# Patient Record
Sex: FEMALE | Race: WHITE | NOT HISPANIC OR LATINO | Employment: UNEMPLOYED | URBAN - METROPOLITAN AREA
[De-identification: names, ages, dates, MRNs, and addresses within clinical notes are randomized per-mention and may not be internally consistent; named-entity substitution may affect disease eponyms.]

---

## 2019-05-29 ENCOUNTER — TRANSFERRED RECORDS (OUTPATIENT)
Dept: HEALTH INFORMATION MANAGEMENT | Facility: CLINIC | Age: 39
End: 2019-05-29

## 2021-02-15 ENCOUNTER — TRANSFERRED RECORDS (OUTPATIENT)
Dept: HEALTH INFORMATION MANAGEMENT | Facility: CLINIC | Age: 41
End: 2021-02-15

## 2021-04-05 ENCOUNTER — TRANSFERRED RECORDS (OUTPATIENT)
Dept: HEALTH INFORMATION MANAGEMENT | Facility: CLINIC | Age: 41
End: 2021-04-05

## 2021-04-05 LAB
ALT SERPL-CCNC: 12 U/L (ref 0–55)
AST SERPL-CCNC: 12 U/L (ref 10–40)
CHOLESTEROL (EXTERNAL): 147 MG/DL (ref 0–199)
CREATININE (EXTERNAL): 0.73 MG/DL (ref 0.55–1.02)
GFR ESTIMATED (EXTERNAL): >60 ML/MIN/1.73M2
GLUCOSE (EXTERNAL): 74 MG/DL (ref 70–100)
HDLC SERPL-MCNC: 48 MG/DL
HPV ABSTRACT: ABNORMAL
LDL CHOLESTEROL CALCULATED (EXTERNAL): 76 MG/DL
NON HDL CHOLESTEROL (EXTERNAL): 99 MG/DL
PAP-ABSTRACT: NORMAL
POTASSIUM (EXTERNAL): 4.1 MMOL/L (ref 3.5–5.1)
TRIGLYCERIDES (EXTERNAL): 114 MG/DL
TSH SERPL-ACNC: 0.56 UIU/ML (ref 0.3–4.5)

## 2021-05-18 ENCOUNTER — TRANSFERRED RECORDS (OUTPATIENT)
Dept: HEALTH INFORMATION MANAGEMENT | Facility: CLINIC | Age: 41
End: 2021-05-18

## 2021-06-10 ENCOUNTER — TRANSFERRED RECORDS (OUTPATIENT)
Dept: HEALTH INFORMATION MANAGEMENT | Facility: CLINIC | Age: 41
End: 2021-06-10

## 2021-09-01 ENCOUNTER — TRANSFERRED RECORDS (OUTPATIENT)
Dept: HEALTH INFORMATION MANAGEMENT | Facility: CLINIC | Age: 41
End: 2021-09-01

## 2021-09-01 LAB
CREATININE (EXTERNAL): 0.79 MG/DL (ref 0.55–1.02)
GFR ESTIMATED (EXTERNAL): >60 ML/MIN/1.73M2
GLUCOSE (EXTERNAL): 97 MG/DL (ref 70–100)
POTASSIUM (EXTERNAL): 4.2 MMOL/L (ref 3.5–5.1)

## 2021-09-13 ENCOUNTER — TRANSFERRED RECORDS (OUTPATIENT)
Dept: HEALTH INFORMATION MANAGEMENT | Facility: CLINIC | Age: 41
End: 2021-09-13

## 2021-09-16 ENCOUNTER — TRANSFERRED RECORDS (OUTPATIENT)
Dept: HEALTH INFORMATION MANAGEMENT | Facility: CLINIC | Age: 41
End: 2021-09-16

## 2021-09-24 ENCOUNTER — TELEPHONE (OUTPATIENT)
Dept: FAMILY MEDICINE | Facility: CLINIC | Age: 41
End: 2021-09-24

## 2021-09-27 NOTE — TELEPHONE ENCOUNTER
Attempted to call patient, no voicemail set up unable to leave message. Pt has a weight loss intake appointment on 09/29/2021, calling to confirm she is coming to her appointment 30 minutes early to complete forms as she does not have my chart and he forms are not complete.    Scarlett Clement LPN  9/27/2021  12:50 PM

## 2021-09-28 NOTE — TELEPHONE ENCOUNTER
Called pt-she will come at 10:30am tomorrow to fill out her forms for her appointment.    Scarlett Clement LPN  9/28/2021  10:48 AM

## 2021-09-29 ENCOUNTER — OFFICE VISIT (OUTPATIENT)
Dept: FAMILY MEDICINE | Facility: CLINIC | Age: 41
End: 2021-09-29
Payer: MEDICAID

## 2021-09-29 VITALS
RESPIRATION RATE: 18 BRPM | DIASTOLIC BLOOD PRESSURE: 68 MMHG | WEIGHT: 195 LBS | OXYGEN SATURATION: 98 % | SYSTOLIC BLOOD PRESSURE: 134 MMHG | HEIGHT: 61 IN | HEART RATE: 68 BPM | BODY MASS INDEX: 36.82 KG/M2

## 2021-09-29 DIAGNOSIS — M54.50 CHRONIC LOW BACK PAIN, UNSPECIFIED BACK PAIN LATERALITY, UNSPECIFIED WHETHER SCIATICA PRESENT: ICD-10-CM

## 2021-09-29 DIAGNOSIS — F32.A DEPRESSION, UNSPECIFIED DEPRESSION TYPE: ICD-10-CM

## 2021-09-29 DIAGNOSIS — G47.00 INSOMNIA, UNSPECIFIED TYPE: ICD-10-CM

## 2021-09-29 DIAGNOSIS — Z86.718 PERSONAL HISTORY OF DVT (DEEP VEIN THROMBOSIS): ICD-10-CM

## 2021-09-29 DIAGNOSIS — R53.81 PHYSICAL DECONDITIONING: ICD-10-CM

## 2021-09-29 DIAGNOSIS — E66.812 CLASS 2 OBESITY WITH BODY MASS INDEX (BMI) OF 36.0 TO 36.9 IN ADULT, UNSPECIFIED OBESITY TYPE, UNSPECIFIED WHETHER SERIOUS COMORBIDITY PRESENT: Primary | ICD-10-CM

## 2021-09-29 DIAGNOSIS — G89.29 CHRONIC LOW BACK PAIN, UNSPECIFIED BACK PAIN LATERALITY, UNSPECIFIED WHETHER SCIATICA PRESENT: ICD-10-CM

## 2021-09-29 PROCEDURE — 99205 OFFICE O/P NEW HI 60 MIN: CPT | Performed by: FAMILY MEDICINE

## 2021-09-29 RX ORDER — DULOXETIN HYDROCHLORIDE 60 MG/1
60 CAPSULE, DELAYED RELEASE ORAL 2 TIMES DAILY
COMMUNITY
End: 2022-12-23 | Stop reason: DRUGHIGH

## 2021-09-29 RX ORDER — HYDROXYZINE HYDROCHLORIDE 25 MG/1
25 TABLET, FILM COATED ORAL 3 TIMES DAILY PRN
COMMUNITY
End: 2024-02-20

## 2021-09-29 RX ORDER — ASPIRIN 81 MG/1
81 TABLET ORAL DAILY
COMMUNITY

## 2021-09-29 RX ORDER — TRAZODONE HYDROCHLORIDE 150 MG/1
25 TABLET ORAL AT BEDTIME
COMMUNITY
End: 2024-07-09

## 2021-09-29 RX ORDER — CYCLOBENZAPRINE HCL 10 MG
10 TABLET ORAL 3 TIMES DAILY PRN
COMMUNITY
End: 2022-02-25

## 2021-09-29 RX ORDER — CETIRIZINE HYDROCHLORIDE 10 MG/1
10 TABLET ORAL 2 TIMES DAILY
COMMUNITY

## 2021-09-29 RX ORDER — BIOTIN 1 MG
1000 TABLET ORAL DAILY
COMMUNITY
End: 2021-10-27

## 2021-09-29 RX ORDER — FAMOTIDINE 20 MG
TABLET ORAL
COMMUNITY
End: 2022-12-23 | Stop reason: ALTCHOICE

## 2021-09-29 RX ORDER — MULTIVITAMIN,THERAPEUTIC
1 TABLET ORAL DAILY
COMMUNITY
End: 2021-10-27

## 2021-09-29 RX ORDER — GABAPENTIN 600 MG/1
600 TABLET ORAL 3 TIMES DAILY
COMMUNITY

## 2021-09-29 ASSESSMENT — MIFFLIN-ST. JEOR: SCORE: 1496.89
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
## 2021-09-29 NOTE — PATIENT INSTRUCTIONS
Macronutrient Goals    Minimum 3? meals per day, control daily calories   - 1500? female   - 1600 male  Minimum of 4 palm servings of protein daily.  Begin everyday with protein    - ~80 gm for female   - 120 gm for male  Be mindful of net carbs 50-75 gm/day  - (Total carbs - fiber)   - Less than 5 gm of carbs with breakfast    Supplementation   - 1 multivitamin   - clear and copious urination (adequate water consumption)   - 2000 mg fish oil capsules    - 2000 IU Vitamin D (Unless checked during clinic visit I will be checking your level today and may send a prescriptionto your pharmacy if necessary)    Ketogenic diet?    Coffee: Try heavy lifting cream with your coffee.  You could also try bulletproof coffee (butter with coffee plus/minus MCT Oil).  Lastly you could also swing it with simply ALLUOSE (artificial sweetener sugar substitute)    Dr. Dave Campos MD   - The Obesity Code   - YouTube    Dr. Sean Stratton MD   - Always Hungry    Dr. Farzana Flanagan, DO.   Search for her name in Youtube with added criteria: Desmond Garcia, PhD   - Why We Sleep   - search for author name and podcast for a number of interviews  _______________________________    Interested articles on nutrition:    Saturated fat: Saturated Fats and Health: A Reassessment and Proposal for Food-Based Recommendations: JACC State-of-the-Art Review June (https://www.jacc.org/doi/full/10.1016/j.jacc.2020.05.077)    A Pesco-Mediterranean Diet With Intermittent Fasting: JACC Review Topic of the Week (https://www.jacc.org/doi/full/10.1016/j.jacc.2020.07.049)

## 2021-09-29 NOTE — ASSESSMENT & PLAN NOTE
"40 year old year old female in clinic today to discuss treatment of the following conditions through diet and lifestyle modification and weight loss:  1. Class 2 obesity with body mass index (BMI) of 36.0 to 36.9 in adult, unspecified obesity type, unspecified whether serious comorbidity present    2. Depression, unspecified depression type    3. Chronic low back pain, unspecified back pain laterality, unspecified whether sciatica present    4. Insomnia, unspecified type    5. Physical deconditioning    6. Personal history of DVT (deep vein thrombosis)      Intake: This individual has not struggled with weight her entire life.  This is a phenomenon over the past several years.  I suspect that chronic pain and depression are contributory.  Unclear if she is gained weight as result of her medications although she is on a number of medications of potentially have weight gain as a side effect.  Energy is poor.  She describes symptoms of hypoglycemia which suggests some degree of metabolic syndrome.  She has central adiposity.  Her laboratory testing was not available for review although she tells me that she recently had testing which was \"good.\"  We do lengthy conversation regarding nutrition.  I suggested nutritional restriction of carbohydrates with a focus on protein.  Currently, she is eating 1 meal per day.  Recommended that she focus on eating both lunch and dinner in an effort to improve her nutrition and her energy.  She will continue to try to be physically active.  She is consuming artificial sweeteners throughout the day as she flavors her water.  We discussed that this might be a barrier to losing weight and she should consider decreasing or eliminating the supplement.  First significant other is with her who does much of the cooking.  It sounds like the 2 of them are interested in nutritional change.  We reviewed medications.  She tells me that she is been on phentermine in the past without much efficacy.  " This fits with her lack of eating throughout the day and then eating only in the evening.  I do not believe she is overeating.  Phentermine probably would not be helpful at this point.  Given that she is already on mental health medicines, I would avoid Contrave at least initially.  She would do quite well on semaglutide although I doubt she get coverage with her insurance plan.  If she does have laboratory evidence of metabolic syndrome or prediabetes I had consider starting Metformin as a complement to a low carbohydrate nutritional plan.  We will follow up with a video visit in 6 weeks or so.  Consider laboratory testing (Alisia-IR or GTT?).

## 2021-10-06 ENCOUNTER — VIRTUAL VISIT (OUTPATIENT)
Dept: SURGERY | Facility: CLINIC | Age: 41
End: 2021-10-06
Payer: COMMERCIAL

## 2021-10-06 DIAGNOSIS — E66.812 CLASS 2 OBESITY WITH BODY MASS INDEX (BMI) OF 36.0 TO 36.9 IN ADULT, UNSPECIFIED OBESITY TYPE, UNSPECIFIED WHETHER SERIOUS COMORBIDITY PRESENT: Primary | ICD-10-CM

## 2021-10-06 DIAGNOSIS — Z71.3 NUTRITIONAL COUNSELING: ICD-10-CM

## 2021-10-06 PROCEDURE — 97802 MEDICAL NUTRITION INDIV IN: CPT | Mod: 95 | Performed by: DIETITIAN, REGISTERED

## 2021-10-06 NOTE — PROGRESS NOTES
Bridgett Oliveira is a 40 year old who is being evaluated via a billable video visit.      How would you like to obtain your AVS? MyChart  If the video visit is dropped, the invitation should be resent by: Send to e-mail at: [email or web address]  Will anyone else be joining your video visit? No      Video Start Time: 2:04 PM      Medical Weight Loss Initial Diet Evaluation  Assessment:  Bridgett is presenting today for a new weight management nutrition consultation. Pt has had an initial appointment with Dr. Beach.  Is actually considering Bariatric Surgery (LSG) at this time.    Weight loss medication: None.       Anthropometrics:    Current Weight: 195 lbs   BMI: There is no height or weight on file to calculate BMI.   Ideal body weight: 47.8 kg (105 lb 6.1 oz)  Adjusted ideal body weight: 64.1 kg (141 lb 3.6 oz)    Estimated RMR ([place] equation):  1500 kcals x 1.3 (light active) = 1950 kcals (for weight maintenance)    Recommended Protein Intake: 60-80 grams of protein/day    Medical History:  Patient Active Problem List   Diagnosis     Class 2 obesity with body mass index (BMI) of 36.0 to 36.9 in adult, unspecified obesity type, unspecified whether serious comorbidity present     Depression, unspecified depression type     Chronic low back pain, unspecified back pain laterality, unspecified whether sciatica present     Insomnia, unspecified type     Personal history of DVT (deep vein thrombosis)      Diabetes: No   HbA1c:  No results found for: HGBA1C    Nutrition History:   Food allergies/intolerances/cultural or religous food customs: No     Vitamins/Mineral Supplementation: Vitamin D, Calcium, Biotin, Prenatal (needs to )     Dietary Recall:  Breakfast: 4 cups of coffee with creamer   Lunch: granola bars or fruit (typically something to snack on vs a meal)   Dinner: meat and veggies or sushi   Typical Snacks: frozen fudge bars (2)   Overnight eating: No     Eating out: 1 time/month  (occasional pizza)     Beverages: Coffee, Flavored Water (64-96 oz/day), Beer 1-2 cans/day     Exercise: no set regimen-does have a treadmill   Walking/Hunting 1 mile/day (almost daily)     Nutrition Diagnosis (PES statement):   Overweight/Obesity (NC 3.3) related to overeating and poor lifestyle habits as evidenced by patient report of excessive energy intake, lack of exercise, and BMI of 36.84 kg/m2.     Nutrition Intervention  1. Food and/or Nutrient Delivery   a. Placed emphasis on importance of developing a healthy meal routine, aiming for 3 meals a day and no snacks.  b. Discussed using a protein supplement as a meal replacement.  2. Nutrition Education   a. Discussed with patient how to build a meal: the importance of including a lean/low fat protein at each meal, include a source of vegetables at a minimum of lunch and dinner and limiting carbohydrate intake to <25% per meal.  b. Educated on sources of lean protein, portion sizes, the amount of grams found in each source. Recommend patient to aim for 20-30g protein at each meal.  c. Educated on how to read a food label: keeping total fat <10g and sugar <10g per serving.  d. Discussed the importance of adequate hydration, with emphasis on drinking 64oz of water or zero calorie beverages per day.    3. Nutrition Counseling   a. Encouraged importance of developing routine exercise for health benefits and weight loss.    Goals established by patient:   1. Focus on protein first, aiming for 60-80 grams of protein/day.    2. Trial of Protein Shake as an option for consistency of breakfast.   3. Track intake via food journal, aiming for 7980-8136 calories/day.   Handouts provided:  None     Assessment/Plan:    Pt will follow up in 1 month(s) with bariatrician and 1 month(s) with dietitian.     Video-Visit Details    Type of service:  Video Visit    Video End Time:2:47 PM    Originating Location (pt. Location): Home    Distant Location (provider location):  [facility]  [facility] [place]     Platform used for Video Visit: Allison Roberts, RD

## 2021-10-06 NOTE — LETTER
10/6/2021         RE: Bridgett Oliveira  [address] [facility] [address]        Dear Colleague,    Thank you for referring your patient, Bridgett Oliveira, to the [facility] AND BARIATRICS CARE [place]. Please see a copy of my visit note below.    Bridgett Oliveira is a 40 year old who is being evaluated via a billable video visit.      How would you like to obtain your AVS? MyChart  If the video visit is dropped, the invitation should be resent by: Send to e-mail at: [email or web address]  Will anyone else be joining your video visit? No      Video Start Time: 2:04 PM      Medical Weight Loss Initial Diet Evaluation  Assessment:  Bridgett is presenting today for a new weight management nutrition consultation. Pt has had an initial appointment with Dr. Beach.  Is actually considering Bariatric Surgery (LSG) at this time.    Weight loss medication: None.       Anthropometrics:    Current Weight: 195 lbs   BMI: There is no height or weight on file to calculate BMI.   Ideal body weight: 47.8 kg (105 lb 6.1 oz)  Adjusted ideal body weight: 64.1 kg (141 lb 3.6 oz)    Estimated RMR (Rabun-St Jeor equation):  1500 kcals x 1.3 (light active) = 1950 kcals (for weight maintenance)    Recommended Protein Intake: 60-80 grams of protein/day    Medical History:  Patient Active Problem List   Diagnosis     Class 2 obesity with body mass index (BMI) of 36.0 to 36.9 in adult, unspecified obesity type, unspecified whether serious comorbidity present     Depression, unspecified depression type     Chronic low back pain, unspecified back pain laterality, unspecified whether sciatica present     Insomnia, unspecified type     Personal history of DVT (deep vein thrombosis)      Diabetes: No   HbA1c:  No results found for: HGBA1C    Nutrition History:   Food allergies/intolerances/cultural or religous food customs: No     Vitamins/Mineral Supplementation: Vitamin D, Calcium, Biotin, Prenatal (needs to pick  up)     Dietary Recall:  Breakfast: 4 cups of coffee with creamer   Lunch: granola bars or fruit (typically something to snack on vs a meal)   Dinner: meat and veggies or sushi   Typical Snacks: frozen fudge bars (2)   Overnight eating: No     Eating out: 1 time/month (occasional pizza)     Beverages: Coffee, Flavored Water (64-96 oz/day), Beer 1-2 cans/day     Exercise: no set regimen-does have a treadmill   Walking/Hunting 1 mile/day (almost daily)     Nutrition Diagnosis (PES statement):   Overweight/Obesity (NC 3.3) related to overeating and poor lifestyle habits as evidenced by patient report of excessive energy intake, lack of exercise, and BMI of 36.84 kg/m2.     Nutrition Intervention  1. Food and/or Nutrient Delivery   a. Placed emphasis on importance of developing a healthy meal routine, aiming for 3 meals a day and no snacks.  b. Discussed using a protein supplement as a meal replacement.  2. Nutrition Education   a. Discussed with patient how to build a meal: the importance of including a lean/low fat protein at each meal, include a source of vegetables at a minimum of lunch and dinner and limiting carbohydrate intake to <25% per meal.  b. Educated on sources of lean protein, portion sizes, the amount of grams found in each source. Recommend patient to aim for 20-30g protein at each meal.  c. Educated on how to read a food label: keeping total fat <10g and sugar <10g per serving.  d. Discussed the importance of adequate hydration, with emphasis on drinking 64oz of water or zero calorie beverages per day.    3. Nutrition Counseling   a. Encouraged importance of developing routine exercise for health benefits and weight loss.    Goals established by patient:   1. Focus on protein first, aiming for 60-80 grams of protein/day.    2. Trial of Protein Shake as an option for consistency of breakfast.   3. Track intake via food journal, aiming for 8255-6116 calories/day.   Handouts provided:  None      Assessment/Plan:    Pt will follow up in 1 month(s) with bariatrician and 1 month(s) with dietitian.     Video-Visit Details    Type of service:  Video Visit    Video End Time:2:47 PM    Originating Location (pt. Location): Home    Distant Location (provider location):  [facility] [place]     Platform used for Video Visit: Allison Roberts RD        Again, thank you for allowing me to participate in the care of your patient.        Sincerely,        Nubia Roberts RD

## 2021-10-17 ENCOUNTER — HEALTH MAINTENANCE LETTER (OUTPATIENT)
Age: 41
End: 2021-10-17

## 2021-10-27 ENCOUNTER — OFFICE VISIT (OUTPATIENT)
Dept: FAMILY MEDICINE | Facility: CLINIC | Age: 41
End: 2021-10-27
Payer: COMMERCIAL

## 2021-10-27 VITALS
OXYGEN SATURATION: 96 % | DIASTOLIC BLOOD PRESSURE: 62 MMHG | WEIGHT: 195.2 LBS | HEIGHT: 61 IN | HEART RATE: 83 BPM | BODY MASS INDEX: 36.85 KG/M2 | SYSTOLIC BLOOD PRESSURE: 110 MMHG

## 2021-10-27 DIAGNOSIS — E66.812 CLASS 2 OBESITY WITH BODY MASS INDEX (BMI) OF 36.0 TO 36.9 IN ADULT, UNSPECIFIED OBESITY TYPE, UNSPECIFIED WHETHER SERIOUS COMORBIDITY PRESENT: Primary | ICD-10-CM

## 2021-10-27 PROCEDURE — 99215 OFFICE O/P EST HI 40 MIN: CPT | Performed by: FAMILY MEDICINE

## 2021-10-27 RX ORDER — SEMAGLUTIDE 0.25 MG/.5ML
0.25 INJECTION, SOLUTION SUBCUTANEOUS WEEKLY
Qty: 2 ML | Refills: 0 | Status: SHIPPED | OUTPATIENT
Start: 2021-10-27 | End: 2022-01-17

## 2021-10-27 RX ORDER — METFORMIN HCL 500 MG
500 TABLET, EXTENDED RELEASE 24 HR ORAL
Qty: 30 TABLET | Refills: 1 | Status: SHIPPED | OUTPATIENT
Start: 2021-10-27 | End: 2022-05-24

## 2021-10-27 ASSESSMENT — PATIENT HEALTH QUESTIONNAIRE - PHQ9
SUM OF ALL RESPONSES TO PHQ QUESTIONS 1-9: 24
5. POOR APPETITE OR OVEREATING: NEARLY EVERY DAY

## 2021-10-27 ASSESSMENT — ANXIETY QUESTIONNAIRES
5. BEING SO RESTLESS THAT IT IS HARD TO SIT STILL: NEARLY EVERY DAY
2. NOT BEING ABLE TO STOP OR CONTROL WORRYING: NEARLY EVERY DAY
IF YOU CHECKED OFF ANY PROBLEMS ON THIS QUESTIONNAIRE, HOW DIFFICULT HAVE THESE PROBLEMS MADE IT FOR YOU TO DO YOUR WORK, TAKE CARE OF THINGS AT HOME, OR GET ALONG WITH OTHER PEOPLE: EXTREMELY DIFFICULT
1. FEELING NERVOUS, ANXIOUS, OR ON EDGE: NEARLY EVERY DAY
7. FEELING AFRAID AS IF SOMETHING AWFUL MIGHT HAPPEN: SEVERAL DAYS
6. BECOMING EASILY ANNOYED OR IRRITABLE: NEARLY EVERY DAY
GAD7 TOTAL SCORE: 18
3. WORRYING TOO MUCH ABOUT DIFFERENT THINGS: MORE THAN HALF THE DAYS

## 2021-10-27 ASSESSMENT — MIFFLIN-ST. JEOR: SCORE: 1497.8

## 2021-10-27 NOTE — ASSESSMENT & PLAN NOTE
40 year old year old female in clinic today to discuss treatment of the following conditions through diet and lifestyle modification and weight loss:  1. Class 2 obesity with body mass index (BMI) of 36.0 to 36.9 in adult, unspecified obesity type, unspecified whether serious comorbidity present      The patient's weight loss result since the last visit was mixed based on weight stability.  She expresses frustration at lack of weight loss.  She has made a number of changes.  She is restricted her carbohydrates quite a bit.  At the same time, she does have some symptoms of low blood sugar that requires her to take sugar tabs (bottle caps).  Today, she expresses an interest in surgical weight loss.  I am concerned that she will not meet criteria given that her BMI is 37 without medical comorbidities.  She may have obstructive sleep apnea which is not been evaluated.  I reviewed her labs via her other health system today and she has had a normal cholesterol profile as well as a normal fasting glucose level.  Thyroid was also measured within the past 6 months without abnormalities.  Last time, we did not consider pharmaceuticals.  I believe she would do well on a GLP-1 receptor agonist primarily for the insulin resistance effects.  However, I am not confident it will be covered by her insurance.  We also talked through whether or not she might benefit from Metformin.  Both medications were prescribed and after we get word that she will not get coverage for Wegovy, we will proceed with Metformin.  Follow-up in 1 month.  A note was sent to one of our surgical tract colleagues to see if it is true that she would not meet her criteria.

## 2021-10-27 NOTE — PROGRESS NOTES
Assessment and Plan:     Class 2 obesity with body mass index (BMI) of 36.0 to 36.9 in adult, unspecified obesity type, unspecified whether serious comorbidity present  40 year old year old female in clinic today to discuss treatment of the following conditions through diet and lifestyle modification and weight loss:  1. Class 2 obesity with body mass index (BMI) of 36.0 to 36.9 in adult, unspecified obesity type, unspecified whether serious comorbidity present      The patient's weight loss result since the last visit was mixed based on weight stability.  She expresses frustration at lack of weight loss.  She has made a number of changes.  She is restricted her carbohydrates quite a bit.  At the same time, she does have some symptoms of low blood sugar that requires her to take sugar tabs (bottle caps).  Today, she expresses an interest in surgical weight loss.  I am concerned that she will not meet criteria given that her BMI is 37 without medical comorbidities.  She may have obstructive sleep apnea which is not been evaluated.  I reviewed her labs via her other health system today and she has had a normal cholesterol profile as well as a normal fasting glucose level.  Thyroid was also measured within the past 6 months without abnormalities.  Last time, we did not consider pharmaceuticals.  I believe she would do well on a GLP-1 receptor agonist primarily for the insulin resistance effects.  However, I am not confident it will be covered by her insurance.  We also talked through whether or not she might benefit from Metformin.  Both medications were prescribed and after we get word that she will not get coverage for Wegovy, we will proceed with Metformin.  Follow-up in 1 month.  A note was sent to one of our surgical tract colleagues to see if it is true that she would not meet her criteria.    40 minutes spent on the date of the encounter doing chart review, history and exam, documentation and further activities  "per the note    Return in about 4 weeks (around 11/24/2021).    Chief Complaint   Patient presents with     RECHECK     1 month f/u     Subjective  Patient presents for treatment of chronic, comorbid conditions using intensive therapeutic lifestyle interventions including nutrition, physical activity, and behavior management.   - successes: none   - struggles: \"constantly tired.\"  She likes what she has been meeting.  Lots of animal based protein.  Corn and green beans.  Working to increase number of meals.  For lunch she has been making \"small wraps.\"  Pain continues.       Reviewed history:    Problems             Objective:  /62 (BP Location: Left arm, Patient Position: Sitting, Cuff Size: Adult Regular)   Pulse 83   Ht 1.549 m (5' 1\")   Wt 88.5 kg (195 lb 3.2 oz)   SpO2 96%   BMI 36.88 kg/m    Change from start of program:       Body mass index is 36.88 kg/m .  No LMP recorded.  Physical Exam  Nursing note reviewed.   Constitutional:       General: She is not in acute distress.     Appearance: Normal appearance. She is not ill-appearing.   HENT:      Head: Normocephalic and atraumatic.   Eyes:      Extraocular Movements: Extraocular movements intact.      Conjunctiva/sclera: Conjunctivae normal.   Pulmonary:      Effort: Pulmonary effort is normal.   Neurological:      Mental Status: She is alert and oriented to person, place, and time.   Psychiatric:         Attention and Perception: Attention normal.         Speech: Speech normal.         Thought Content: Thought content normal.         This note has been dictated using voice recognition software. Any grammatical or context distortions are unintentional and inherent to the software  "

## 2021-10-27 NOTE — Clinical Note
This patient is now interested in surgical option.  Given her BMI with no comorbidities (maybe ELIAZAR), might she qualify?

## 2021-10-28 ASSESSMENT — ANXIETY QUESTIONNAIRES: GAD7 TOTAL SCORE: 18

## 2021-10-29 ENCOUNTER — TELEPHONE (OUTPATIENT)
Dept: FAMILY MEDICINE | Facility: CLINIC | Age: 41
End: 2021-10-29
Payer: COMMERCIAL

## 2021-10-29 NOTE — TELEPHONE ENCOUNTER
Prior Authorization Request  Who s requesting:  Pharmacy  [facility]   Medication Name: wegovy   Insurance Plan: [employer]  Insurance Member ID Number:  [id number]  CoverMyMeds Key: n/a   Informed patient that prior authorizations can take up to 10 business days for response:   Yes  Okay to leave a detailed message: Yes     This is a new medication for Timothy Clement LPN  10/29/2021  4:35 PM      Route to pool:  [facility]

## 2021-11-01 NOTE — TELEPHONE ENCOUNTER
PA Initiation    Medication: Semaglutide-Weight Management (WEGOVY) 0.25 MG/0.5ML SOAJ  Insurance Company: [place] Medicaid ([employer]) - Phone [phone] Fax [phone]  Pharmacy Filling the Rx: [facility] - [place], [place] - [address]  Filling Pharmacy Phone: [phone]  Filling Pharmacy Fax: [phone]  Start Date: 11/1/2021    Filled out form and [facility]

## 2021-11-08 NOTE — TELEPHONE ENCOUNTER
Prior Authorization Approval    Authorization Effective Date: 11/8/2021  Authorization Expiration Date: 5/8/2022  Medication: Semaglutide-Weight Management (WEGOVY) 0.25 MG/0.5ML SOAJ  Approved Dose/Quantity:   Reference #:     Insurance Company: [place] Medicaid ([employer]) - Phone [phone] Fax [phone]  Expected CoPay:       CoPay Card Available:      Foundation Assistance Needed:    Which Pharmacy is filling the prescription (Not needed for infusion/clinic administered): [facility] - [place], [place] - [facility] [address]  Pharmacy Notified: Yes  Patient Notified: Yes    PA# [id number]  PA approved for 6 months; for renewal patient will need to have 5% weight loss

## 2021-11-16 ENCOUNTER — VIRTUAL VISIT (OUTPATIENT)
Dept: SURGERY | Facility: CLINIC | Age: 41
End: 2021-11-16
Payer: COMMERCIAL

## 2021-11-16 DIAGNOSIS — E66.812 CLASS 2 OBESITY WITH BODY MASS INDEX (BMI) OF 36.0 TO 36.9 IN ADULT, UNSPECIFIED OBESITY TYPE, UNSPECIFIED WHETHER SERIOUS COMORBIDITY PRESENT: Primary | ICD-10-CM

## 2021-11-16 DIAGNOSIS — Z71.3 NUTRITIONAL COUNSELING: ICD-10-CM

## 2021-11-16 PROCEDURE — 97803 MED NUTRITION INDIV SUBSEQ: CPT | Mod: 95 | Performed by: DIETITIAN, REGISTERED

## 2021-11-16 NOTE — PROGRESS NOTES
Bridgett Oliveira is a 40 year old who is being evaluated via a billable telephone visit.      What phone number would you like to be contacted at? [phone]  How would you like to obtain your AVS? [facility]  Weight Loss Follow-Up Diet Evaluation  Assessment:  Bridgett is presenting today for a follow up weight management nutrition consultation. Pt has had an initial appointment with Dr. Beach.  Patient reports that she would still like to pursue Bariatric Surgery (LSG).   Weight loss medication: Wegovy, Metformin. (plans on starting Sunday)   Pt's weight is 193 lbs   Initial weight: 195 lbs   Weight change: 2 lbs (down)     No flowsheet data found.  BMI: There is no height or weight on file to calculate BMI.  Ideal body weight: 47.8 kg (105 lb 6.1 oz)  Adjusted ideal body weight: 64.1 kg (141 lb 4.9 oz)    Estimated RMR (Leroy-St Thompson equation):   1491 kcals x 1.3 (light active) = 1938 kcals (for weight maintenance)     Recommended Protein Intake: 60-80 grams of protein/day  Patient Active Problem List:  Patient Active Problem List   Diagnosis     Class 2 obesity with body mass index (BMI) of 36.0 to 36.9 in adult, unspecified obesity type, unspecified whether serious comorbidity present     Depression, unspecified depression type     Chronic low back pain, unspecified back pain laterality, unspecified whether sciatica present     Insomnia, unspecified type     Personal history of DVT (deep vein thrombosis)     Diabetes: No     Progress on goals from last visit: Patient has been working on smaller portion sizes at meals.  Patient reports that she has been watching her carbohydrate consumption and trying to reduce, noting that she tried to eliminate them and felt very sick with little to no energy.  Patient reports that she has been incorporating veggies, especially at supper.  Patient reports that she has not tried a protein shake at this point, noting lack of finances.  Has not started to  track intake at this point for food journal.      Dietary Recall:  Breakfast: occasional bowl of cereal or english muffin with PB/butter and jelly  Lunch:enlish muffin or eggs  Dinner:beef or venison, vegetables (green beans or carrots), occasional potatoes or corn   Typical snacks: granola bar on occasion or sweets (when out hunting, however has been working on consuming less lately)   Beverages: Flavored Water (3-32 oz bottles), Beer (however hasn't consumed anything for the last week)  Exercise: Walking/Hunting 1 mile (most days of the week)     Nutrition Diagnosis:    Overweight/Obesity (NC 3.3) related to overeating and poor lifestyle habits as evidenced by patient's subjective statements (excessive energy  Intake, lack of exercise) and BMI of 36.6 kg/m2.     Intervention:  1. Food and/or nutrient delivery: balanced meals, adequate protein   2. Nutrition education: protein intake, food journal   3. Nutrition counseling: exercise regimen       Monitoring/Evaluation:    Goals:  1. Focus on protein first at each meal, aiming for 20-30 grams of protein/meal, 3 meals/day.   2. Start tracking intake via food journal, aiming for 3565-0404 calories/day.   3. Try incorporating walking on the treadmill a couple days/week in addition to hunting.     Patient to follow up in 2 month(s) with RD        Phone call duration: 20 minutes      Nubia Roberts RD

## 2021-11-16 NOTE — LETTER
11/16/2021         RE: Bridgett Oliveira  [address] [facility] [address]        Dear Colleague,    Thank you for referring your patient, Bridgett Oliveira, to the [facility] CL[facility] AND BARIATRICS CARE [place]. Please see a copy of my visit note below.    Bridgett Oliveira is a 40 year old who is being evaluated via a billable telephone visit.      What phone number would you like to be contacted at? [phone]  How would you like to obtain your AVS? [facility]  Weight Loss Follow-Up Diet Evaluation  Assessment:  Bridgett is presenting today for a follow up weight management nutrition consultation. Pt has had an initial appointment with Dr. Beach.  Patient reports that she would still like to pursue Bariatric Surgery (LSG).   Weight loss medication: Wegovy, Metformin. (plans on starting Sunday)   Pt's weight is 193 lbs   Initial weight: 195 lbs   Weight change: 2 lbs (down)     No flowsheet data found.  BMI: There is no height or weight on file to calculate BMI.  Ideal body weight: 47.8 kg (105 lb 6.1 oz)  Adjusted ideal body weight: 64.1 kg (141 lb 4.9 oz)    Estimated RMR ([place]-St Jeor equation):   1491 kcals x 1.3 (light active) = 1938 kcals (for weight maintenance)     Recommended Protein Intake: 60-80 grams of protein/day  Patient Active Problem List:  Patient Active Problem List   Diagnosis     Class 2 obesity with body mass index (BMI) of 36.0 to 36.9 in adult, unspecified obesity type, unspecified whether serious comorbidity present     Depression, unspecified depression type     Chronic low back pain, unspecified back pain laterality, unspecified whether sciatica present     Insomnia, unspecified type     Personal history of DVT (deep vein thrombosis)     Diabetes: No     Progress on goals from last visit: Patient has been working on smaller portion sizes at meals.  Patient reports that she has been watching her carbohydrate consumption and trying to reduce, noting that  she tried to eliminate them and felt very sick with little to no energy.  Patient reports that she has been incorporating veggies, especially at supper.  Patient reports that she has not tried a protein shake at this point, noting lack of finances.  Has not started to track intake at this point for food journal.      Dietary Recall:  Breakfast: occasional bowl of cereal or english muffin with PB/butter and jelly  Lunch:enlish muffin or eggs  Dinner:beef or venison, vegetables (green beans or carrots), occasional potatoes or corn   Typical snacks: granola bar on occasion or sweets (when out hunting, however has been working on consuming less lately)   Beverages: Flavored Water (3-32 oz bottles), Beer (however hasn't consumed anything for the last week)  Exercise: Walking/Hunting 1 mile (most days of the week)     Nutrition Diagnosis:    Overweight/Obesity (NC 3.3) related to overeating and poor lifestyle habits as evidenced by patient's subjective statements (excessive energy  Intake, lack of exercise) and BMI of 36.6 kg/m2.     Intervention:  1. Food and/or nutrient delivery: balanced meals, adequate protein   2. Nutrition education: protein intake, food journal   3. Nutrition counseling: exercise regimen       Monitoring/Evaluation:    Goals:  1. Focus on protein first at each meal, aiming for 20-30 grams of protein/meal, 3 meals/day.   2. Start tracking intake via food journal, aiming for 5726-3192 calories/day.   3. Try incorporating walking on the treadmill a couple days/week in addition to hunting.     Patient to follow up in 2 month(s) with [facility]        Phone call duration: 20 minutes      Nubia Roberts RD        Again, thank you for allowing me to participate in the care of your patient.        Sincerely,        Nubia Roberts RD

## 2021-11-17 ENCOUNTER — TELEPHONE (OUTPATIENT)
Dept: FAMILY MEDICINE | Facility: CLINIC | Age: 41
End: 2021-11-17
Payer: COMMERCIAL

## 2021-11-17 NOTE — TELEPHONE ENCOUNTER
Pt calling requesting referral for GI sleeve. This was discussed at last visit. Patient has [employer]BS [place] through the State. [facility] is fine with patient. No call back needed if provider agreeable.

## 2021-11-17 NOTE — TELEPHONE ENCOUNTER
Transfer to the surgical clinic seems reasonable.  BMI greater than 35.  However I am not sure that she has a comorbidity.  Please let her know that they may not see her has eligible for a gastric sleeve.  It is true that we discussed the gastric sleeve and bariatric surgery in general but I did not imply that they would offer her any particular surgery.  I think the consultation is for her to learn more about the process and for them to assess whether or not she meets criteria.  Please clarify this with the patient.

## 2021-12-03 ENCOUNTER — VIRTUAL VISIT (OUTPATIENT)
Dept: FAMILY MEDICINE | Facility: CLINIC | Age: 41
End: 2021-12-03
Payer: COMMERCIAL

## 2021-12-03 DIAGNOSIS — E66.812 CLASS 2 OBESITY WITH BODY MASS INDEX (BMI) OF 36.0 TO 36.9 IN ADULT, UNSPECIFIED OBESITY TYPE, UNSPECIFIED WHETHER SERIOUS COMORBIDITY PRESENT: Primary | ICD-10-CM

## 2021-12-03 PROCEDURE — 99213 OFFICE O/P EST LOW 20 MIN: CPT | Mod: 95 | Performed by: FAMILY MEDICINE

## 2021-12-03 RX ORDER — SEMAGLUTIDE 0.5 MG/.5ML
0.5 INJECTION, SOLUTION SUBCUTANEOUS WEEKLY
Qty: 2 ML | Refills: 0 | Status: SHIPPED | OUTPATIENT
Start: 2021-12-03 | End: 2021-12-25

## 2021-12-03 NOTE — PROGRESS NOTES
Timothy is a 40 year old who is being evaluated via a billable video visit.      How would you like to obtain your AVS? MyChart/EMAIL  If the video visit is dropped, the invitation should be resent by: Send to e-mail at: [email or web address]  Will anyone else be joining your video visit? Yes: [email or web address]. How would they like to receive their invitation? Send to e-mail at: [email or web address]     Assessment and Plan:     Class 2 obesity with body mass index (BMI) of 36.0 to 36.9 in adult, unspecified obesity type, unspecified whether serious comorbidity present  40 year old year old female in clinic today to discuss treatment of the following conditions through diet and lifestyle modification and weight loss:  1. Class 2 obesity with body mass index (BMI) of 36.0 to 36.9 in adult, unspecified obesity type, unspecified whether serious comorbidity present      The patient's weight loss result since the last visit was mixed based on weight stability.  The patient expresses frustration that her weight has not decreased despite improvements of nutrition, restriction of carbohydrates, focus on protein and decrease portion size.  She is having some nausea as she is gone on semaglutide.  She is only done 2 injections thus far.  Otherwise she feels reasonably well.  -I did confirm that she has considered whether or not she can be pregnant (given comments on the size of her pants as well as lack of weight loss).  She has taken a home urine pregnancy test which was negative.  She also is noted sexually active for approximately 6 weeks.  -Discontinue Metformin.  Given that we are successful in getting coverage for semaglutide we will focus on that therapy for now.  -We will continue semaglutide.  She will increase to 0.5 mg/week in a couple of weeks (assuming her symptoms of nausea subside somewhat).  I will plan on increasing her dose to 1.0 mg in 4-6 weeks.  We will plan on checking in via video visit in  6-8 weeks.    Return in about 6 weeks (around 1/14/2022) for Weight loss follow-up visit, (video visit).    Chief Complaint   Patient presents with     RECHECK     Subjective  Patient presents for treatment of chronic, comorbid conditions using intensive therapeutic lifestyle interventions including nutrition, physical activity, and behavior management.   - weight has been stable.  She has been on semaglutide.    - successes: portion control.  Less alcohol.  She is using smaller plates.      - struggles: frustrated that she feels like she needs to buy new jeans.  Worn out doing dishes.  Hurts to stand.    - movement: limited.     - tracking/journaling:  Ad jarad.     - nutritional plan: high protein.  Carb reduction.  Adherent: yes. Deviations from plan: none identified.    - hunger/cravings: controlled.  She does not wake up at night feeling like she needs to eat.    - medication benefits: less hunger. side effects: nausea.  She has felt poor for a couple of days.  She has noticed that her appetite has changed.    - she has taken a UPT.  No sexual activity in 6 weeks. She generally check every couple of months.     Reviewed history:   Meds  Problems  Med Hx  Surg Hx          Objective:  There were no vitals taken for this visit.  Change from start of program:                  There is no height or weight on file to calculate BMI.  No LMP recorded.  Physical Exam  Nursing note reviewed.   Constitutional:       General: She is not in acute distress.     Appearance: Normal appearance. She is not ill-appearing.   HENT:      Head: Normocephalic and atraumatic.   Eyes:      Extraocular Movements: Extraocular movements intact.      Conjunctiva/sclera: Conjunctivae normal.   Pulmonary:      Effort: Pulmonary effort is normal.   Neurological:      Mental Status: She is alert and oriented to person, place, and time.   Psychiatric:         Attention and Perception: Attention normal.         Mood and Affect: Mood normal.          Speech: Speech normal.         Thought Content: Thought content normal.         This note has been dictated using voice recognition software. Any grammatical or context distortions are unintentional and inherent to the software    Video-Visit Details    Type of service:  Video Visit    Video End Time:10:35 AM    Originating Location (pt. Location): Home    Distant Location (provider location):  [facility]     Platform used for Video Visit: [employer]

## 2021-12-03 NOTE — ASSESSMENT & PLAN NOTE
40 year old year old female in clinic today to discuss treatment of the following conditions through diet and lifestyle modification and weight loss:  1. Class 2 obesity with body mass index (BMI) of 36.0 to 36.9 in adult, unspecified obesity type, unspecified whether serious comorbidity present      The patient's weight loss result since the last visit was mixed based on weight stability.  The patient expresses frustration that her weight has not decreased despite improvements of nutrition, restriction of carbohydrates, focus on protein and decrease portion size.  She is having some nausea as she is gone on semaglutide.  She is only done 2 injections thus far.  Otherwise she feels reasonably well.  -I did confirm that she has considered whether or not she can be pregnant (given comments on the size of her pants as well as lack of weight loss).  She has taken a home urine pregnancy test which was negative.  She also is noted sexually active for approximately 6 weeks.  -Discontinue Metformin.  Given that we are successful in getting coverage for semaglutide we will focus on that therapy for now.  -We will continue semaglutide.  She will increase to 0.5 mg/week in a couple of weeks (assuming her symptoms of nausea subside somewhat).  I will plan on increasing her dose to 1.0 mg in 4-6 weeks.  We will plan on checking in via video visit in 6-8 weeks.  
Yes

## 2021-12-12 ENCOUNTER — HEALTH MAINTENANCE LETTER (OUTPATIENT)
Age: 41
End: 2021-12-12

## 2022-01-11 ENCOUNTER — TELEPHONE (OUTPATIENT)
Dept: SURGERY | Facility: CLINIC | Age: 42
End: 2022-01-11
Payer: COMMERCIAL

## 2022-01-11 NOTE — TELEPHONE ENCOUNTER
Attempts made to contact patient in regards to appointment that was scheduled for today at 1:30 pm via video with this writer.  Patient did not link onto video visit nor answer the phone with attempts made, unable to leave a voicemail.  Therefore left patient a message via [employer] to see if patient would like to reschedule and to either notify this writer or contact the call center, providing her with the contact information to reschedule.

## 2022-01-13 DIAGNOSIS — E66.812 CLASS 2 OBESITY WITH BODY MASS INDEX (BMI) OF 36.0 TO 36.9 IN ADULT, UNSPECIFIED OBESITY TYPE, UNSPECIFIED WHETHER SERIOUS COMORBIDITY PRESENT: ICD-10-CM

## 2022-01-16 NOTE — TELEPHONE ENCOUNTER
Outpatient Medication Detail     Disp Refills Start End ZUHAIR   Semaglutide-Weight Management (WEGOVY) 0.5 MG/0.5ML SOAJ 2 mL 0 12/3/2021 2021 --   Sig - Route: Inject 0.5 mg Subcutaneous once a week for 4 doses - Subcutaneous   Sent to pharmacy as: Wegovy 0.5 MG/0.5ML Subcutaneous Solution Auto-injector (Semaglutide-Weight Management)   Class: E-Prescribe   Order: [id number]   E-Prescribing Status: Receipt confirmed by pharmacy (12/3/2021 10:41 AM CST)       Routing refill request to provider for review/approval because:  Drug not on the [facility] refill protocol   The request is for a dosage on a script that has .  The script on the active list is for 0.25 mg dose.  Please clarify.    Last Written Prescription Date:  10/27/21  Last Fill Quantity: 2 ml,  # refills: 0   Last office visit provider:  12/3/21     Requested Prescriptions   Pending Prescriptions Disp Refills     WEGOVY 0.5 MG/0.5ML SOAJ [Pharmacy Med Name: Wegovy 0.5 mg/0.5 mL subcutaneous pen injector] 2 mL 0     Sig: INJECT 0.5mg SUBCUTANEOUSLY ONCE WEEKLY for 4 doses       There is no refill protocol information for this order          Eber Henson RN 22 11:29 AM

## 2022-01-17 RX ORDER — SEMAGLUTIDE 1 MG/.5ML
1 INJECTION, SOLUTION SUBCUTANEOUS WEEKLY
Qty: 2 ML | Refills: 0 | Status: SHIPPED | OUTPATIENT
Start: 2022-01-17 | End: 2022-02-08

## 2022-01-17 RX ORDER — SEMAGLUTIDE 0.5 MG/.5ML
INJECTION, SOLUTION SUBCUTANEOUS
Qty: 2 ML | Refills: 0 | OUTPATIENT
Start: 2022-01-17

## 2022-02-14 DIAGNOSIS — E66.812 CLASS 2 OBESITY WITH BODY MASS INDEX (BMI) OF 36.0 TO 36.9 IN ADULT, UNSPECIFIED OBESITY TYPE, UNSPECIFIED WHETHER SERIOUS COMORBIDITY PRESENT: ICD-10-CM

## 2022-02-17 RX ORDER — SEMAGLUTIDE 1 MG/.5ML
INJECTION, SOLUTION SUBCUTANEOUS
Qty: 2 ML | Refills: 0 | OUTPATIENT
Start: 2022-02-17

## 2022-02-17 RX ORDER — SEMAGLUTIDE 1.7 MG/.75ML
1.7 INJECTION, SOLUTION SUBCUTANEOUS WEEKLY
Qty: 3 ML | Refills: 0 | Status: SHIPPED | OUTPATIENT
Start: 2022-02-17 | End: 2022-02-25

## 2022-02-17 NOTE — TELEPHONE ENCOUNTER
Left message to call back for: dylon  Information to relay to patient: see below and relay to patient. Please schedule follow up weight loss appointment with [facility]

## 2022-02-17 NOTE — TELEPHONE ENCOUNTER
Routing refill request to provider for review/approval because:  Drug not on the [facility] refill protocol     Last Written Prescription Date:  1/17/22  Last Fill Quantity: 2ml,  # refills: 0   Last office visit provider:  12/3/21     Requested Prescriptions   Pending Prescriptions Disp Refills     WEGOVY 1 MG/0.5ML SOAJ [Pharmacy Med Name: Wegovy 1 mg/0.5 mL subcutaneous pen injector] 2 mL 0     Sig: INJECT 1MG SUBCUTANEOUSLY EVERY A WEEK FOR 4 DOSES       There is no refill protocol information for this order          Priscilla Grullon RN 02/17/22 8:23 AM

## 2022-02-25 ENCOUNTER — VIRTUAL VISIT (OUTPATIENT)
Dept: FAMILY MEDICINE | Facility: CLINIC | Age: 42
End: 2022-02-25
Payer: COMMERCIAL

## 2022-02-25 DIAGNOSIS — E66.812 CLASS 2 OBESITY WITH BODY MASS INDEX (BMI) OF 36.0 TO 36.9 IN ADULT, UNSPECIFIED OBESITY TYPE, UNSPECIFIED WHETHER SERIOUS COMORBIDITY PRESENT: Primary | ICD-10-CM

## 2022-02-25 PROCEDURE — 99213 OFFICE O/P EST LOW 20 MIN: CPT | Mod: 95 | Performed by: FAMILY MEDICINE

## 2022-02-25 RX ORDER — SEMAGLUTIDE 2.4 MG/.75ML
2.4 INJECTION, SOLUTION SUBCUTANEOUS WEEKLY
Qty: 3 ML | Refills: 3 | Status: SHIPPED | OUTPATIENT
Start: 2022-02-25 | End: 2022-05-24

## 2022-02-25 NOTE — ASSESSMENT & PLAN NOTE
"41 year old year old female in clinic today to discuss treatment of the following conditions through diet and lifestyle modification and weight loss:  1. Class 2 obesity with body mass index (BMI) of 36.0 to 36.9 in adult, unspecified obesity type, unspecified whether serious comorbidity present      The patient's weight loss result since the last visit was successful based on weight loss.  This is an improvement in comparison to our more recent discussions.  We identified that she is consuming large amounts of sugar with her creamer.  Sleep deprived (caffeine?).  I am concerned that this will be a reason that she will regain weight in the future.  \"Feeling okay.\"    - increase to 2.4 mg of semaglutide.   - follow-up in 2-3 months. Review nutrition.  Plan 24 hour diet recall.  "

## 2022-02-25 NOTE — PROGRESS NOTES
"Type of service:  Video Visit    Bridgett Oliveira is a 41 year old female who is being evaluated via a billable video visit.      How would you like to obtain your AVS? MyChart  If dropped from the video visit, the video invitation should be resent by: Send email to [email or web address]  Will anyone else be joining your video visit? No    Video Start Time: 4:24 PM  Video End Time (time video stopped): 4:45 PM  Originating Location (pt. Location): Home  Distant Location (provider location):  [facility]   Platform used for Video Visit: [facility]    Assessment and Plan:     Class 2 obesity with body mass index (BMI) of 36.0 to 36.9 in adult, unspecified obesity type, unspecified whether serious comorbidity present  41 year old year old female in clinic today to discuss treatment of the following conditions through diet and lifestyle modification and weight loss:  1. Class 2 obesity with body mass index (BMI) of 36.0 to 36.9 in adult, unspecified obesity type, unspecified whether serious comorbidity present      The patient's weight loss result since the last visit was successful based on weight loss.  This is an improvement in comparison to our more recent discussions.  We identified that she is consuming large amounts of sugar with her creamer.  Sleep deprived (caffeine?).  I am concerned that this will be a reason that she will regain weight in the future.  \"Feeling okay.\"    - increase to 2.4 mg of semaglutide.   - follow-up in 2-3 months. Review nutrition.  Plan 24 hour diet recall.    Return in about 3 months (around 5/25/2022) for Weight loss follow-up visit.    Chief Complaint   Patient presents with     Weight Loss     FU     Subjective  Patient presents for treatment of chronic, comorbid conditions using intensive therapeutic lifestyle interventions including nutrition, physical activity, and behavior management.   - successes: \"medications seems to be working.\"  Small portions.   - " struggles: stress higher.  She started working.  [employer].     - movement: lots of walking with her job.  Otherwise minimal.  Tired at the end of day.    - tracking/journaling: portion control   - nutritional plan: carb restriction.  Adherent: yes Deviations from plan: infrequent.    - hunger/cravings: improved.     - medication benefits: losing weight. She has not noticed that she has really changed her nutrition all that much. Now losing weight.  side effects: none.   - sleep: it takes between 2 and 4 hours to fall sleep.  She sleeps until 3:15 in the morning in order to get out of bed at 4:15.   Caffeine: 24 ounces throughout the day.  Lots of creamer 64 ounces x3 each month.       Reviewed history:    Meds  Problems             Objective:  There were no vitals taken for this visit.  Change from start of program:       Vitals - Patient Reported  Weight (Patient Reported): 83.2 kg (183 lb 8 oz)      There is no height or weight on file to calculate BMI.  No LMP recorded.  Physical Exam  Nursing note reviewed.   Constitutional:       General: She is not in acute distress.     Appearance: Normal appearance. She is not ill-appearing.   HENT:      Head: Normocephalic and atraumatic.   Eyes:      Extraocular Movements: Extraocular movements intact.      Conjunctiva/sclera: Conjunctivae normal.   Pulmonary:      Effort: Pulmonary effort is normal.   Neurological:      Mental Status: She is alert and oriented to person, place, and time.   Psychiatric:         Attention and Perception: Attention normal.         Mood and Affect: Mood normal.         Speech: Speech normal.         Thought Content: Thought content normal.         This note has been dictated using voice recognition software. Any grammatical or context distortions are unintentional and inherent to the software

## 2022-05-12 ENCOUNTER — TELEPHONE (OUTPATIENT)
Dept: FAMILY MEDICINE | Facility: CLINIC | Age: 42
End: 2022-05-12
Payer: COMMERCIAL

## 2022-05-12 NOTE — TELEPHONE ENCOUNTER
Prior Authorization Request  Who s requesting:  Pharmacy  Pharm[facility] Name and Location: [facility]  Medication Name: Semaglutide-Weight Management (WEGOVY) 2.4 MG/0.75ML SOAJ  Insurance Plan: [employer]  Insurance Member ID Number:  [id number]  CoverMyMeds Key:   Informed patient that prior authorizations can take up to 10 business days for response:   NA  Okay to leave a detailed message: No     Route to pool:  [facility]

## 2022-05-17 NOTE — TELEPHONE ENCOUNTER
Please advise-    [employer] [place] medicaid requires clinical documentation of updated weight  measured in a clinic setting WITH a 5% weight loss since start of medication to continue therapy.     Per records weight when started on medication was measured at 88.5kg on 10/27/2021.     Weight reported by patient on 2/25 during virtual visit is 83.2kg.     For PA to be approved weight needs to be measured in clinic with a documented 5% weight loss from original weight measured on 10/27/2021 clinic visit.

## 2022-05-24 ENCOUNTER — OFFICE VISIT (OUTPATIENT)
Dept: FAMILY MEDICINE | Facility: CLINIC | Age: 42
End: 2022-05-24
Payer: COMMERCIAL

## 2022-05-24 VITALS
BODY MASS INDEX: 31.64 KG/M2 | HEART RATE: 76 BPM | SYSTOLIC BLOOD PRESSURE: 94 MMHG | OXYGEN SATURATION: 97 % | HEIGHT: 61 IN | DIASTOLIC BLOOD PRESSURE: 62 MMHG | WEIGHT: 167.6 LBS

## 2022-05-24 DIAGNOSIS — E66.811 CLASS 1 OBESITY DUE TO EXCESS CALORIES WITH SERIOUS COMORBIDITY AND BODY MASS INDEX (BMI) OF 31.0 TO 31.9 IN ADULT: ICD-10-CM

## 2022-05-24 DIAGNOSIS — E66.09 CLASS 1 OBESITY DUE TO EXCESS CALORIES WITH SERIOUS COMORBIDITY AND BODY MASS INDEX (BMI) OF 31.0 TO 31.9 IN ADULT: ICD-10-CM

## 2022-05-24 PROCEDURE — 99213 OFFICE O/P EST LOW 20 MIN: CPT | Performed by: FAMILY MEDICINE

## 2022-05-24 RX ORDER — SEMAGLUTIDE 2.4 MG/.75ML
2.4 INJECTION, SOLUTION SUBCUTANEOUS WEEKLY
Qty: 3 ML | Refills: 5 | Status: SHIPPED | OUTPATIENT
Start: 2022-05-24 | End: 2022-10-21

## 2022-05-24 ASSESSMENT — PATIENT HEALTH QUESTIONNAIRE - PHQ9
SUM OF ALL RESPONSES TO PHQ QUESTIONS 1-9: 23
10. IF YOU CHECKED OFF ANY PROBLEMS, HOW DIFFICULT HAVE THESE PROBLEMS MADE IT FOR YOU TO DO YOUR WORK, TAKE CARE OF THINGS AT HOME, OR GET ALONG WITH OTHER PEOPLE: EXTREMELY DIFFICULT
SUM OF ALL RESPONSES TO PHQ QUESTIONS 1-9: 23

## 2022-05-24 NOTE — ASSESSMENT & PLAN NOTE
41 year old year old female in clinic today to discuss treatment of the following conditions through diet and lifestyle modification and weight loss:  1. Class 1 obesity due to excess calories with serious comorbidity and body mass index (BMI) of 31.0 to 31.9 in adult      The patient's weight loss result since the last visit was successful based on weight loss.  She feels great and is doing well.  Wants to continue to lose weight.  Looking at portions.   Spouse supportive.   - eating well.   - movement / exercise:    - continue semaglutide.  Now down ~14%.     - discontinue metformin (she has been off since last fall).

## 2022-05-24 NOTE — PROGRESS NOTES
"Assessment and Plan:     Class 1 obesity due to excess calories with serious comorbidity and body mass index (BMI) of 31.0 to 31.9 in adult  41 year old year old female in clinic today to discuss treatment of the following conditions through diet and lifestyle modification and weight loss:  1. Class 1 obesity due to excess calories with serious comorbidity and body mass index (BMI) of 31.0 to 31.9 in adult      The patient's weight loss result since the last visit was successful based on weight loss.  She feels great and is doing well.  Wants to continue to lose weight.  Looking at portions.   Spouse supportive.   - eating well.   - movement / exercise:    - continue semaglutide.  Now down ~14%.     - discontinue metformin (she has been off since last fall).    6 month follow-up recommended.     Chief Complaint   Patient presents with     Weight Loss     Subjective  Patient presents for treatment of chronic, comorbid conditions using intensive therapeutic lifestyle interventions including nutrition, physical activity, and behavior management.   - successes: feels great.  Doing well.    - struggles: ongoing depressive symptoms.     - movement: limited by depressive symptoms.  Able to go up and down stairs.     - tracking/journaling:  Ad jarad. Portion controls.    - Deviations from plan: creamer with sugar   - hunger/cravings: improve.   - medication benefits: weight falling at 2.4mg/week. side effects: none (unless she misses a dose)    Reviewed history:  Tobacco  Allergies  Meds  Problems  Med Hx  Surg Hx  Fam Hx            Objective:  BP 94/62 (BP Location: Left arm, Patient Position: Sitting, Cuff Size: Adult Large)   Pulse 76   Ht 1.549 m (5' 1\")   Wt 76 kg (167 lb 9.6 oz)   SpO2 97%   BMI 31.67 kg/m    Change from start of program: % Weight Change: -14.05 %                Body mass index is 31.67 kg/m .  No LMP recorded.  Physical Exam  Nursing note reviewed.   Constitutional:       General: She is " not in acute distress.     Appearance: Normal appearance. She is not ill-appearing.   HENT:      Head: Normocephalic and atraumatic.   Eyes:      Extraocular Movements: Extraocular movements intact.      Conjunctiva/sclera: Conjunctivae normal.   Pulmonary:      Effort: Pulmonary effort is normal.   Neurological:      Mental Status: She is alert and oriented to person, place, and time.   Psychiatric:         Attention and Perception: Attention normal.         Mood and Affect: Mood normal.         Speech: Speech normal.         Thought Content: Thought content normal.         This note has been dictated using voice recognition software. Any grammatical or context distortions are unintentional and inherent to the software

## 2022-05-31 ENCOUNTER — TELEPHONE (OUTPATIENT)
Dept: FAMILY MEDICINE | Facility: CLINIC | Age: 42
End: 2022-05-31
Payer: COMMERCIAL

## 2022-05-31 NOTE — TELEPHONE ENCOUNTER
Seems once pt was seen info was not added to PA request so this halted the process.  Today urgent request was sent to PA team with new weight   Unable to reach pt as she does not have voice mail set up on her phone

## 2022-05-31 NOTE — TELEPHONE ENCOUNTER
Central Prior Authorization Team   Phone: [phone]    PA Initiation    Medication: Wegovy 2.4mg   Insurance Company: [employer] ([facility]) - Phone [phone] Fax [phone]  Pharmacy Filling the Rx: [facility] - [place], [place] - [address]  Filling Pharmacy Phone: [phone]  Filling Pharmacy Fax:    Start Date: 5/31/2022    All three manual forms filled out, faxed back with clinic notes to [employer] PMAP [phone]   Asked for urgent review.

## 2022-05-31 NOTE — TELEPHONE ENCOUNTER
Sybil Nolen         Telephone Encounter   Signed   Creation Time:  5/31/2022 11:39 AM                    5/24/22 appt pt was at 76kg  Please do PA asap

## 2022-05-31 NOTE — TELEPHONE ENCOUNTER
Reason for Call:  Other call back    Detailed comments: Patient calling for update on PA for Wegovy. Patient would like to speak to care team about what to do after missing 2 doses of Wegovy while waiting for PA to be approved.    Phone Number Patient can be reached at: [phone]    Can we leave a detailed message on this number? YES    Call taken on 5/31/2022 at 10:59 AM by Andreina Woodard

## 2022-06-06 NOTE — TELEPHONE ENCOUNTER
Calling [facility] [phone] to inquire about status of PA.     Per rep, multiple corrections are needed.  Form needs to be re-submitted.     PA# [id number]       Re-filling out form, faxed again to [employer] ([facility] for review.

## 2022-06-06 NOTE — TELEPHONE ENCOUNTER
Patient calling to report she spoke with [facility] and they have not received faxed forms.

## 2022-06-07 NOTE — TELEPHONE ENCOUNTER
"Calling [facility] [phone] to make sure they received the new paperwork that faxed yesterday.     Per rep this was received and is currently in review.  However she did note that \"box 23\" needed to have the total amount of days supply for the duration of treatment, not the days supply for the medication. She suggested asking for the max amount of days supply of 180.     This was changed on the form and re-faxed.      "

## 2022-06-08 NOTE — TELEPHONE ENCOUNTER
Calling [employer] to check on status of request-     Spoke with rep who stated everything was received including the correction from yesterday.   She stated since I'm on the phone with her she can help push this along.  Put me on hold to finish request.     Approved for 6 months for the Wegovy 2.4mg/0.75ml.    Called [facility] pharmacy with information.  Transferred me to [facility].  Left message with approval information. Pharmacy will alert patient when this is ready for .     PA approval #[id number]    Prior Authorization Approval    Authorization Effective Date:  6/8/2022  Authorization Expiration Date:  12/8/2022  Medication: Wegovy 2.4mg   Approved Dose/Quantity:   Reference #:     Insurance Company: [employer] ([facility]) - Phone [phone] Fax [phone]  Expected CoPay:       CoPay Card Available:      Foundation Assistance Needed:    Which Pharmacy is filling the prescription (Not needed for infusion/clinic administered): [facility] - [place], [place] - [facility] [address]  Pharmacy Notified:  yes  Patient Notified:  yes

## 2022-10-03 ENCOUNTER — HEALTH MAINTENANCE LETTER (OUTPATIENT)
Age: 42
End: 2022-10-03

## 2022-10-21 DIAGNOSIS — E66.09 CLASS 1 OBESITY DUE TO EXCESS CALORIES WITH SERIOUS COMORBIDITY AND BODY MASS INDEX (BMI) OF 31.0 TO 31.9 IN ADULT: ICD-10-CM

## 2022-10-21 DIAGNOSIS — E66.811 CLASS 1 OBESITY DUE TO EXCESS CALORIES WITH SERIOUS COMORBIDITY AND BODY MASS INDEX (BMI) OF 31.0 TO 31.9 IN ADULT: ICD-10-CM

## 2022-10-21 RX ORDER — SEMAGLUTIDE 2.4 MG/.75ML
INJECTION, SOLUTION SUBCUTANEOUS
Qty: 3 ML | Refills: 5 | Status: SHIPPED | OUTPATIENT
Start: 2022-10-21 | End: 2023-01-11

## 2022-10-21 NOTE — TELEPHONE ENCOUNTER
Routing refill request to provider for review/approval because:  Drug not on the [facility] refill protocol       Last Written Prescription Date: 5/24/22  Last Fill Quantity: 3ml,  # refills: 5  Last office visit provider: 5/24/22    Requested Prescriptions   Pending Prescriptions Disp Refills     WEGOVY 2.4 MG/0.75ML SOAJ [Pharmacy Med Name: Wegovy 2.4 mg/0.75 mL subcutaneous pen injector] 3 mL 5     Sig: INJECT 2.4MG SUBCUTANEOUSLY ONCE WEEKLY       There is no refill protocol information for this order

## 2022-11-28 ENCOUNTER — ALLIED HEALTH/NURSE VISIT (OUTPATIENT)
Dept: FAMILY MEDICINE | Facility: CLINIC | Age: 42
End: 2022-11-28
Payer: COMMERCIAL

## 2022-11-28 VITALS — WEIGHT: 143.4 LBS | BODY MASS INDEX: 27.1 KG/M2

## 2022-11-28 DIAGNOSIS — E66.811 CLASS 1 OBESITY DUE TO EXCESS CALORIES WITH SERIOUS COMORBIDITY AND BODY MASS INDEX (BMI) OF 31.0 TO 31.9 IN ADULT: Primary | ICD-10-CM

## 2022-11-28 DIAGNOSIS — E66.09 CLASS 1 OBESITY DUE TO EXCESS CALORIES WITH SERIOUS COMORBIDITY AND BODY MASS INDEX (BMI) OF 31.0 TO 31.9 IN ADULT: Primary | ICD-10-CM

## 2022-11-28 PROCEDURE — 99207 PR NO CHARGE NURSE ONLY: CPT

## 2022-11-29 ENCOUNTER — TELEPHONE (OUTPATIENT)
Dept: FAMILY MEDICINE | Facility: CLINIC | Age: 42
End: 2022-11-29

## 2022-11-29 NOTE — TELEPHONE ENCOUNTER
Prior Authorization Request  Who s requesting:  Patient  Pharmacy Name and Location: [facility] [place]  Medication Name: WEGOVY 2.4 MG/0.75ML SOAJ  Insurance Plan: [employer] [facility]  Insurance Member ID Number:  [id number]  CoverMyMeds Key:   Informed patient that prior authorizations can take up to 10 business days for response:   Yes  Okay to leave a detailed message: Yes    Previous PA for medication expires 12/8/2022.

## 2022-11-30 NOTE — TELEPHONE ENCOUNTER
PA Initiation    Medication: Wegovy PA RENEWAL INITIATED  Insurance Company: [place] [employer] ([facility]) - Phone [phone] Fax [phone]  Pharmacy Filling the Rx: [facility] - [place] - [address]  Filling Pharmacy Phone: [phone]  Filling Pharmacy Fax:    Start Date: 11/30/2022    Central Prior Authorization Team   Phone: [phone]      Faxed [place] [facility] Cover Letter/[place] PA/RF and [place] Obesity Medications Form to [phone]

## 2022-12-07 NOTE — TELEPHONE ENCOUNTER
PRIOR AUTHORIZATION DENIED    Medication: Wegovy PA RENEWAL DENIED    Denial Date: 12/7/2022    Denial Rational: After the expiration date of 12/08/2022 there is a six month waiting period with [facility] for Wegovy.  This patient would be eligible for a new PA 06/09/2023.  The provider could prescribe a stimulant anti-obesity medication in the interim.    Appeal Information: None Available Must wait until after 6 month waiting merly is over to request a new PA.

## 2022-12-08 NOTE — CONFIDENTIAL NOTE
This does not make sense to me.  Semaglutide is not a medication that one goes on and off of.  Please call the patient's pharmacy and clarify.  Did we make a mistake with documentation?

## 2022-12-08 NOTE — TELEPHONE ENCOUNTER
Pharmacy has been called. For every 2 cycles on this medication she need 6 months ( 1 cycle)  Off.  This is to see if patient can maintain the wt loss they have had to consider approval after 6 month time frame.

## 2022-12-12 ENCOUNTER — TELEPHONE (OUTPATIENT)
Dept: FAMILY MEDICINE | Facility: CLINIC | Age: 42
End: 2022-12-12

## 2022-12-12 NOTE — CONFIDENTIAL NOTE
I have never heard of such a requirement.  I have a note sent to one of the clinical pharmacist asking if they have ever heard of this type of framework before filing an appeal.  Unfortunately, this will be a slow process.  I am not sure if there is a workaround.  The medication is meant to be continued long-term without break and so the insurance plans requirement seems completely wrong.    Wondering if a staff member could call the insurance that we have listed for her and verify with the pharmacy is telling the patient.  Then the question would be whether or not I could do a peer to peer discussion of medications or if I need to write a letter.

## 2022-12-12 NOTE — TELEPHONE ENCOUNTER
New Medication Request    What medication are you requesting?: Weight-loss medication    Reason for medication request: Patient's PA has been denied for Wegovy and is requiring a 6-month waiting period before a new one can be requested (See PA encounter for 11/29/22)  Patient states her side effects from her last 2 doses caused really bad muscle spasm side effects because she took them a day late. When she takes them on time there are no side effects.  Patient is wondering if there is another way to appeal for the Wegovy so she can keep taking this on-time or if provider has a different alternative in mind for her to start asap.      Patient offered an appointment? 01/11/23    Preferred Pharmacy:  [facility] - [address]  [address] [place] [address]  Phone: [phone] Fax: [phone]    Could we send this information to you in [place] or would you prefer to receive a phone call?:   Patient would prefer a phone call     Okay to leave a detailed message?: Yes at Cell number on file:    Telephone Information:   Mobile [phone]     Patient works 630AM-3PM M-F Please don't call until after 3PM.

## 2022-12-16 NOTE — TELEPHONE ENCOUNTER
Left message to call back for: [facility]   Information to relay to patient: transfer to Vinayak or Sybil re below

## 2022-12-19 NOTE — TELEPHONE ENCOUNTER
Patient calling to report she has nausea r/t being out of wegovy x1 week. Wondering if there is anything else she can take while the PA is being figured out for this med? Please advise.     OK to leave detailed vm at mobile number listed.

## 2022-12-19 NOTE — TELEPHONE ENCOUNTER
"Patient scheduled 12/23/22 11 AM per provider and RN requests below.      \"Vinayak Ramos, RN  [facility] Scheduling Registration Pool 4 minutes ago (2:22 PM)  Please call and relay message below and assist with scheduling with [facility] on Fri 12/23 at 11am \"     \"Luis Alberto Beach MD [facility] ([facility]) Care Team Pool 31 minutes ago (1:55 PM)  Please schedule later this week.  The 11 AM patient on Friday has indicated that she is planning on canceling.  Perhaps, double book.  We can review alternative medications. \"    "

## 2022-12-23 ENCOUNTER — VIRTUAL VISIT (OUTPATIENT)
Dept: FAMILY MEDICINE | Facility: CLINIC | Age: 42
End: 2022-12-23
Payer: COMMERCIAL

## 2022-12-23 DIAGNOSIS — E66.811 CLASS 1 OBESITY DUE TO EXCESS CALORIES WITH SERIOUS COMORBIDITY AND BODY MASS INDEX (BMI) OF 31.0 TO 31.9 IN ADULT: Primary | ICD-10-CM

## 2022-12-23 DIAGNOSIS — E66.09 CLASS 1 OBESITY DUE TO EXCESS CALORIES WITH SERIOUS COMORBIDITY AND BODY MASS INDEX (BMI) OF 31.0 TO 31.9 IN ADULT: Primary | ICD-10-CM

## 2022-12-23 DIAGNOSIS — R11.0 NAUSEA: ICD-10-CM

## 2022-12-23 PROCEDURE — 99214 OFFICE O/P EST MOD 30 MIN: CPT | Mod: 95 | Performed by: FAMILY MEDICINE

## 2022-12-23 RX ORDER — CYCLOBENZAPRINE HCL 10 MG
1 TABLET ORAL 3 TIMES DAILY
COMMUNITY
Start: 2022-11-16 | End: 2024-02-20

## 2022-12-23 RX ORDER — SUMATRIPTAN 100 MG/1
TABLET, FILM COATED ORAL
COMMUNITY
Start: 2021-02-11

## 2022-12-23 RX ORDER — MECLIZINE HYDROCHLORIDE 25 MG/1
25 TABLET ORAL DAILY
COMMUNITY
Start: 2022-08-31

## 2022-12-23 RX ORDER — LORAZEPAM 0.5 MG/1
0.5 TABLET ORAL PRN
COMMUNITY
Start: 2022-03-03

## 2022-12-23 RX ORDER — NAPROXEN 500 MG/1
500 TABLET ORAL
COMMUNITY
Start: 2022-08-29 | End: 2024-02-20

## 2022-12-23 RX ORDER — DULOXETIN HYDROCHLORIDE 30 MG/1
1 CAPSULE, DELAYED RELEASE ORAL DAILY
COMMUNITY
Start: 2022-08-10 | End: 2024-07-09

## 2022-12-23 RX ORDER — ONDANSETRON 4 MG/1
4 TABLET, ORALLY DISINTEGRATING ORAL EVERY 8 HOURS PRN
Qty: 30 TABLET | Refills: 1 | Status: SHIPPED | OUTPATIENT
Start: 2022-12-23 | End: 2024-09-17

## 2022-12-23 RX ORDER — ONDANSETRON 8 MG/1
1 TABLET, ORALLY DISINTEGRATING ORAL EVERY 8 HOURS PRN
COMMUNITY
Start: 2021-11-17 | End: 2022-12-23

## 2022-12-23 RX ORDER — PHENTERMINE HYDROCHLORIDE 15 MG/1
15 CAPSULE ORAL EVERY MORNING
Qty: 30 CAPSULE | Refills: 0 | Status: SHIPPED | OUTPATIENT
Start: 2022-12-23 | End: 2023-01-11

## 2022-12-23 RX ORDER — OMEPRAZOLE 40 MG/1
40 CAPSULE, DELAYED RELEASE ORAL DAILY
COMMUNITY
Start: 2022-12-01 | End: 2024-07-09

## 2022-12-23 ASSESSMENT — PATIENT HEALTH QUESTIONNAIRE - PHQ9
SUM OF ALL RESPONSES TO PHQ QUESTIONS 1-9: 16
SUM OF ALL RESPONSES TO PHQ QUESTIONS 1-9: 16
10. IF YOU CHECKED OFF ANY PROBLEMS, HOW DIFFICULT HAVE THESE PROBLEMS MADE IT FOR YOU TO DO YOUR WORK, TAKE CARE OF THINGS AT HOME, OR GET ALONG WITH OTHER PEOPLE: EXTREMELY DIFFICULT

## 2022-12-23 ASSESSMENT — ENCOUNTER SYMPTOMS: CONSTITUTIONAL NEGATIVE: 1

## 2022-12-23 NOTE — ASSESSMENT & PLAN NOTE
41 year old year old female in clinic today to discuss treatment of the following conditions through diet and lifestyle modification and weight loss:  1. Class 1 obesity due to excess calories with serious comorbidity and body mass index (BMI) of 31.0 to 31.9 in adult    2. Nausea      This patient has achieved 29% weight loss over the past 14 months while on Wegovy.  Today, we discussed that if she does not take it exactly every 168 hours that she does have symptoms of nausea and discomfort.  Her insurance has stipulated that she needs to go off of the medicine for 6 months and maintain her weight in order to get reapproved.  This is not consistent with any guidelines or recommendations that I have ever seen as these medicines are intended to be long-term medicines and Wegovy specifically requires 4-5 months in order to titrate to the target dose.  This seems reckless and dangerous and makes no sense from a medical perspective.  I will write a letter on her behalf.  I am planning on contacting the [employer] representative to see if they are aware of this stipulation from the [place] medical assistance programs.  If the letter is not successful, hopefully they will offer a peer to peer discussion of this medication second asked the rationale of this odd and irrational stimulation.

## 2022-12-23 NOTE — PROGRESS NOTES
Timothy is a 41 year old who is being evaluated via a billable video visit.      How would you like to obtain your AVS? MyChart  If the video visit is dropped, the invitation should be resent by: Text to cell phone: [phone]  Will anyone else be joining your video visit? No      Video-Visit Details    Type of service:  Video Visit     Originating Location (pt. Location): Home    Distant Location (provider location):  On-site  Platform used for Video Visit: Spotsetter    Problem List Items Addressed This Visit     Class 1 obesity due to excess calories with serious comorbidity and body mass index (BMI) of 31.0 to 31.9 in adult - Primary     41 year old year old female in clinic today to discuss treatment of the following conditions through diet and lifestyle modification and weight loss:  1. Class 1 obesity due to excess calories with serious comorbidity and body mass index (BMI) of 31.0 to 31.9 in adult    2. Nausea      This patient has achieved 29% weight loss over the past 14 months while on Wegovy.  Today, we discussed that if she does not take it exactly every 168 hours that she does have symptoms of nausea and discomfort.  Her insurance has stipulated that she needs to go off of the medicine for 6 months and maintain her weight in order to get reapproved.  This is not consistent with any guidelines or recommendations that I have ever seen as these medicines are intended to be long-term medicines and Wegovy specifically requires 4-5 months in order to titrate to the target dose.  This seems reckless and dangerous and makes no sense from a medical perspective.  I will write a letter on her behalf.  I am planning on contacting the [employer] representative to see if they are aware of this stipulation from the [place] medical assistance programs.  If the letter is not successful, hopefully they will offer a peer to peer discussion of this medication second asked the rationale of this odd and irrational  "stimulation.         Relevant Medications    phentermine (ADIPEX-P) 15 MG capsule   Other Visit Diagnoses     Nausea        Relevant Medications    ondansetron (ZOFRAN ODT) 4 MG ODT tab          Kaleb Aguirre is a 41 year old who presents for the following health issues   Chief Complaint   Patient presents with     Follow Up     MWM--Nausea x1 week      Weight:    - \"I feel amazing.\"  Recent measurement was 137 lbs.  Mental health has struggled. Working well. Active on job.     - she is down ~29%.   - She has had some fluctuation of temperature and mood if her dosing of the medication is delayed.  Symptoms will persist for a few days.  This has been predictable.      History of Present Illness       Reason for visit:  Wegovy weight loss    She eats 0-1 servings of fruits and vegetables daily.She consumes 0 sweetened beverage(s) daily.She exercises with enough effort to increase her heart rate 60 or more minutes per day.  She exercises with enough effort to increase her heart rate 5 days per week. She is missing 1 dose(s) of medications per week.  She is not taking prescribed medications regularly due to other.    Today's PHQ-9         PHQ-9 Total Score: 16    PHQ-9 Q9 Thoughts of better off dead/self-harm past 2 weeks :   Not at all    How difficult have these problems made it for you to do your work, take care of things at home, or get along with other people: Extremely difficult     Review of Systems   Constitutional: Negative.    All other systems reviewed and are negative.           Objective    Vitals - Patient Reported  Weight (Patient Reported): 62.2 kg (137 lb 3.2 oz)        Physical Exam  Nursing note reviewed.   Constitutional:       General: She is not in acute distress.     Appearance: Normal appearance. She is not ill-appearing.   HENT:      Head: Normocephalic and atraumatic.   Eyes:      Extraocular Movements: Extraocular movements intact.      Conjunctiva/sclera: Conjunctivae normal. "   Pulmonary:      Effort: Pulmonary effort is normal.   Neurological:      Mental Status: She is alert and oriented to person, place, and time.   Psychiatric:         Attention and Perception: Attention normal.         Mood and Affect: Mood normal.         Speech: Speech normal.         Thought Content: Thought content normal.             This note has been dictated using voice recognition software. Any grammatical or context distortions are unintentional and inherent to the software

## 2023-01-11 ENCOUNTER — VIRTUAL VISIT (OUTPATIENT)
Dept: FAMILY MEDICINE | Facility: CLINIC | Age: 43
End: 2023-01-11
Payer: COMMERCIAL

## 2023-01-11 DIAGNOSIS — E66.811 CLASS 1 OBESITY DUE TO EXCESS CALORIES WITH SERIOUS COMORBIDITY AND BODY MASS INDEX (BMI) OF 31.0 TO 31.9 IN ADULT: Primary | ICD-10-CM

## 2023-01-11 DIAGNOSIS — E66.09 CLASS 1 OBESITY DUE TO EXCESS CALORIES WITH SERIOUS COMORBIDITY AND BODY MASS INDEX (BMI) OF 31.0 TO 31.9 IN ADULT: Primary | ICD-10-CM

## 2023-01-11 PROCEDURE — 99207 PR NO CHARGE LOS: CPT | Performed by: FAMILY MEDICINE

## 2023-01-11 RX ORDER — PHENTERMINE HYDROCHLORIDE 15 MG/1
15 CAPSULE ORAL EVERY MORNING
Qty: 90 CAPSULE | Refills: 0 | Status: SHIPPED | OUTPATIENT
Start: 2023-01-11 | End: 2024-02-20

## 2023-01-11 NOTE — PROGRESS NOTES
"Today's visit ended up being a brief conversation regarding the status of our appeal of Wegovy.  I directed her to the MyChart note that I sent her on 1/3.  I did speak with the representative of [employer] regarding this medication and the framework of coverage for the [place] medical assistance program and they did confirm that this is the way to structured despite being contrary to the intended use of this medication.  The patient has been maintaining her weight while on phentermine.  We will plan to check an in 2 to 3 months.  It is a bit unclear what it means to \"maintain\" her weight in order to qualify for another year of use of a GLP-1 receptor agonist.  "

## 2023-02-05 ENCOUNTER — HEALTH MAINTENANCE LETTER (OUTPATIENT)
Age: 43
End: 2023-02-05

## 2023-02-20 ENCOUNTER — TELEPHONE (OUTPATIENT)
Dept: FAMILY MEDICINE | Facility: CLINIC | Age: 43
End: 2023-02-20
Payer: COMMERCIAL

## 2023-02-20 NOTE — TELEPHONE ENCOUNTER
FYI - Status Update    Who is Calling: patient    Update: Patient stopped taking phentermine as she is newly pregnant.     Does caller want a call/response back: No

## 2023-03-20 DIAGNOSIS — R11.0 NAUSEA: ICD-10-CM

## 2023-03-21 RX ORDER — ONDANSETRON 4 MG/1
TABLET, ORALLY DISINTEGRATING ORAL
Qty: 30 TABLET | Refills: 1 | OUTPATIENT
Start: 2023-03-21

## 2023-03-21 NOTE — TELEPHONE ENCOUNTER
"Please review    Last Written Prescription Date:  12/23/22  Last Fill Quantity: 30,  # refills: 1   Last office visit provider:  1/11/23     Requested Prescriptions   Pending Prescriptions Disp Refills     ondansetron (ZOFRAN ODT) 4 MG ODT tab [Pharmacy Med Name: ondansetron 4 mg disintegrating tablet] 30 tablet 1     Sig: DISSOLVE 1 TABLET ON TONGUE THEN SWALLOW; TAKE 1 EVERY 8 HOURS AS NEEDED FOR NAUSEA        Antivertigo/Antiemetic Agents Passed - 3/20/2023  1:06 PM        Passed - Recent (12 mo) or future (30 days) visit within the authorizing provider's specialty     Patient has had an office visit with the authorizing provider or a provider within the authorizing providers department within the previous 12 mos or has a future within next 30 days. See \"Patient Info\" tab in inbasket, or \"Choose Columns\" in Meds & Orders section of the refill encounter.              Passed - Medication is active on med list        Passed - Patient is 18 years of age or older             Sheela Mckeon RN 03/20/23 9:37 PM  "

## 2024-02-20 ENCOUNTER — OFFICE VISIT (OUTPATIENT)
Dept: FAMILY MEDICINE | Facility: CLINIC | Age: 44
End: 2024-02-20
Payer: COMMERCIAL

## 2024-02-20 VITALS
TEMPERATURE: 98.5 F | BODY MASS INDEX: 39.06 KG/M2 | OXYGEN SATURATION: 97 % | HEIGHT: 61 IN | DIASTOLIC BLOOD PRESSURE: 74 MMHG | RESPIRATION RATE: 20 BRPM | SYSTOLIC BLOOD PRESSURE: 107 MMHG | HEART RATE: 80 BPM | WEIGHT: 206.9 LBS

## 2024-02-20 DIAGNOSIS — E66.812 CLASS 2 SEVERE OBESITY DUE TO EXCESS CALORIES WITH SERIOUS COMORBIDITY AND BODY MASS INDEX (BMI) OF 39.0 TO 39.9 IN ADULT (H): Primary | ICD-10-CM

## 2024-02-20 DIAGNOSIS — E66.01 CLASS 2 SEVERE OBESITY DUE TO EXCESS CALORIES WITH SERIOUS COMORBIDITY AND BODY MASS INDEX (BMI) OF 39.0 TO 39.9 IN ADULT (H): Primary | ICD-10-CM

## 2024-02-20 DIAGNOSIS — Z86.32 HISTORY OF GESTATIONAL DIABETES: ICD-10-CM

## 2024-02-20 PROCEDURE — 99215 OFFICE O/P EST HI 40 MIN: CPT | Performed by: FAMILY MEDICINE

## 2024-02-20 RX ORDER — BLOOD-GLUCOSE METER
EACH MISCELLANEOUS
COMMUNITY
Start: 2023-07-14

## 2024-02-20 RX ORDER — CLOTRIMAZOLE 1 %
CREAM (GRAM) TOPICAL PRN
COMMUNITY
Start: 2024-01-25

## 2024-02-20 RX ORDER — BUPROPION HYDROCHLORIDE 150 MG/1
150 TABLET ORAL EVERY MORNING
COMMUNITY
Start: 2024-02-07

## 2024-02-20 RX ORDER — PSEUDOEPHED/ACETAMINOPH/DIPHEN 30MG-500MG
TABLET ORAL PRN
COMMUNITY

## 2024-02-20 RX ORDER — TRIAMCINOLONE ACETONIDE 1 MG/G
CREAM TOPICAL
COMMUNITY
Start: 2023-05-24

## 2024-02-20 RX ORDER — VALACYCLOVIR HYDROCHLORIDE 1 G/1
2000 TABLET, FILM COATED ORAL PRN
COMMUNITY
Start: 2024-01-25

## 2024-02-20 RX ORDER — HYDROXYZINE HYDROCHLORIDE 50 MG/1
TABLET, FILM COATED ORAL
COMMUNITY
Start: 2024-01-29

## 2024-02-20 RX ORDER — SEMAGLUTIDE 0.25 MG/.5ML
0.25 INJECTION, SOLUTION SUBCUTANEOUS WEEKLY
Qty: 2 ML | Refills: 0 | Status: SHIPPED | OUTPATIENT
Start: 2024-02-20 | End: 2024-03-13

## 2024-02-20 RX ORDER — HYDROXYZINE PAMOATE 25 MG/1
CAPSULE ORAL
COMMUNITY
Start: 2024-01-18

## 2024-02-20 RX ORDER — LANCETS 30 GAUGE
EACH MISCELLANEOUS
COMMUNITY
Start: 2023-08-16

## 2024-02-20 ASSESSMENT — PATIENT HEALTH QUESTIONNAIRE - PHQ9
10. IF YOU CHECKED OFF ANY PROBLEMS, HOW DIFFICULT HAVE THESE PROBLEMS MADE IT FOR YOU TO DO YOUR WORK, TAKE CARE OF THINGS AT HOME, OR GET ALONG WITH OTHER PEOPLE: EXTREMELY DIFFICULT
SUM OF ALL RESPONSES TO PHQ QUESTIONS 1-9: 22
SUM OF ALL RESPONSES TO PHQ QUESTIONS 1-9: 22

## 2024-02-20 NOTE — PROGRESS NOTES
Assessment & Plan   Problem List Items Addressed This Visit       Class 2 severe obesity due to excess calories with serious comorbidity and body mass index (BMI) of 39.0 to 39.9 in adult (H) - Primary     43 year old year old female in clinic today to discuss treatment of the following conditions through diet and lifestyle modification and weight loss:  1. Class 2 severe obesity due to excess calories with serious comorbidity and body mass index (BMI) of 39.0 to 39.9 in adult (H)    2. History of gestational diabetes    Restart: This patient discontinued Wegovy approximately 14 months ago as her 12-month approval .  We subsequently considered adding phentermine.  However, before starting phentermine she realized she was pregnant.  She returns to clinic with a 5-1/2-month old and complains of struggling with losing the weight that she gained during her pregnancy.  She generally eats lightly throughout the day and tries to restrict portions for her dinner meal and in the evening.  Despite this, she has not lost weight.  She had gestational diabetes which was managed through diet during her pregnancy.  We discussed nutrition.  We discussed physical activity.  Her physical activity is limited by her body habitus and she is struggling with aches and pains.  She is on some medicines that cause weight gain such as gabapentin.  Given her struggles, I think it is necessary to resume a GLP-1 receptor agonist.  We reviewed the specific idiosyncrasies of her insurance plan which does allow for reapplication for GLP-1 receptor agonist after 6 months.  She is 14 months out since the last time she has been on Wegovy.  She is taking part in a structured program called comprehensive weight management through [facility].  There is no relevant contraindications such as personal/family history of thyroid cancer or personal history of pancreatitis.  This would be the second time she is submitting a prior authorization  "for Wegovy.  I believe she meets criteria as outlined by the [facility] website ([email or web address]&[email or web address]&[email or web address]&c=11&nt= ).  If Wegovy is not covered we would shift to Zepbound, medication she has not previously been on.  If both are not an option I think she does need some appetite regulation.  We would consider adding phentermine/topiramate.  She is sexually active and she is not on a contraceptive.  We would need to have a plan to avoid pregnancy.    She should return to clinic (could be virtual) 10 weeks after starting a new medicine.  I would need to see her in clinic approximately 5 months after starting a new medicine to ensure that she has lost sufficient weight to justify ongoing therapy.  Given that she lost 30% of her total body weight while on Wegovy, I anticipate she will do well on the medicine.    Lastly, we discussed that going on and off of these medicines is not typically done in the way that her insurance plan is requiring.  We talked about whether or not it is worth it.  Given that she gained weight with pregnancy I think it is \"worth it\" to try to take the weight off.         Relevant Medications    Semaglutide-Weight Management (WEGOVY) 0.25 MG/0.5ML pen     Other Visit Diagnoses       History of gestational diabetes        Relevant Medications    Semaglutide-Weight Management (WEGOVY) 0.25 MG/0.5ML pen           41 minutes spent by me on the date of the encounter doing chart review, history and exam, documentation and further activities per the note      BMI  Estimated body mass index is 39.09 kg/m  as calculated from the following:    Height as of this encounter: 1.549 m (5' 1\").    Weight as of this encounter: 93.8 kg (206 lb 14.4 oz).   Weight management plan: Specific weight management program called Comprehensive Weight Management discussed    Subjective   Timothy is a 43 year old, presenting for the following health " "issues:  Weight Loss (Follow-up/)        2/20/2024     9:42 AM   Additional Questions   Roomed by xl   Accompanied by spouse     Returns with a 5.5 month old (boy - JJ)  Struggling with weight, She gained weight with pregnancy that has not gone done  Nutrition: focusing on meat and veggie at dinner.  Breakfast: coffee.  Lunch: \"if I have time.\"    - she never started phentermine when prescribed at the end of 2022.  She realized that she was pregnant  -  mobility limited by weight.  Legs and back are painful.      History of Present Illness       Reason for visit:  Obesity    She eats 0-1 servings of fruits and vegetables daily.She consumes 0 sweetened beverage(s) daily.She exercises with enough effort to increase her heart rate 9 or less minutes per day.  She exercises with enough effort to increase her heart rate 3 or less days per week. She is missing 1 dose(s) of medications per week.  She is not taking prescribed medications regularly due to remembering to take.             Objective    /74 (BP Location: Left arm, Patient Position: Sitting, Cuff Size: Adult Large)   Pulse 80   Temp 98.5  F (36.9  C) (Oral)   Resp 20   Ht 1.549 m (5' 1\")   Wt 93.8 kg (206 lb 14.4 oz)   LMP 01/24/2024   SpO2 97%   BMI 39.09 kg/m    Body mass index is 39.09 kg/m .  Physical Exam  Nursing note reviewed.   Constitutional:       General: She is not in acute distress.     Appearance: Normal appearance. She is obese. She is not ill-appearing.   HENT:      Head: Normocephalic and atraumatic.   Eyes:      Extraocular Movements: Extraocular movements intact.      Conjunctiva/sclera: Conjunctivae normal.   Pulmonary:      Effort: Pulmonary effort is normal.   Neurological:      Mental Status: She is alert and oriented to person, place, and time.   Psychiatric:         Attention and Perception: Attention normal.         Mood and Affect: Mood normal.         Speech: Speech normal.         Thought Content: Thought content normal. "                  Signed Electronically by: Luis Alberto Beach MD

## 2024-02-20 NOTE — ASSESSMENT & PLAN NOTE
43 year old year old female in clinic today to discuss treatment of the following conditions through diet and lifestyle modification and weight loss:  1. Class 2 severe obesity due to excess calories with serious comorbidity and body mass index (BMI) of 39.0 to 39.9 in adult (H)    2. History of gestational diabetes      Restart: This patient discontinued Wegovy approximately 14 months ago as her 12-month approval .  We subsequently considered adding phentermine.  However, before starting phentermine she realized she was pregnant.  She returns to clinic with a 5-1/2-month old and complains of struggling with losing the weight that she gained during her pregnancy.  She generally eats lightly throughout the day and tries to restrict portions for her dinner meal and in the evening.  Despite this, she has not lost weight.  She had gestational diabetes which was managed through diet during her pregnancy.  We discussed nutrition.  We discussed physical activity.  Her physical activity is limited by her body habitus and she is struggling with aches and pains.  She is on some medicines that cause weight gain such as gabapentin.  Given her struggles, I think it is necessary to resume a GLP-1 receptor agonist.  We reviewed the specific idiosyncrasies of her insurance plan which does allow for reapplication for GLP-1 receptor agonist after 6 months.  She is 14 months out since the last time she has been on Wegovy.  She is taking part in a structured program called comprehensive weight management through [facility].  There is no relevant contraindications such as personal/family history of thyroid cancer or personal history of pancreatitis.  This would be the second time she is submitting a prior authorization for Wegovy.  I believe she meets criteria as outlined by the [facility] website ([email or web address]?[email or web address]&[email or web address]&s=3&c=11&nt= ).  If Wegovy is not  "covered we would shift to Zepbound, medication she has not previously been on.  If both are not an option I think she does need some appetite regulation.  We would consider adding phentermine/topiramate.  She is sexually active and she is not on a contraceptive.  We would need to have a plan to avoid pregnancy.    She should return to clinic (could be virtual) 10 weeks after starting a new medicine.  I would need to see her in clinic approximately 5 months after starting a new medicine to ensure that she has lost sufficient weight to justify ongoing therapy.  Given that she lost 30% of her total body weight while on Wegovy, I anticipate she will do well on the medicine.    Lastly, we discussed that going on and off of these medicines is not typically done in the way that her insurance plan is requiring.  We talked about whether or not it is worth it.  Given that she gained weight with pregnancy I think it is \"worth it\" to try to take the weight off.  "

## 2024-03-03 ENCOUNTER — HEALTH MAINTENANCE LETTER (OUTPATIENT)
Age: 44
End: 2024-03-03

## 2024-03-11 ENCOUNTER — TELEPHONE (OUTPATIENT)
Dept: FAMILY MEDICINE | Facility: CLINIC | Age: 44
End: 2024-03-11
Payer: COMMERCIAL

## 2024-03-11 DIAGNOSIS — E66.01 CLASS 2 SEVERE OBESITY DUE TO EXCESS CALORIES WITH SERIOUS COMORBIDITY AND BODY MASS INDEX (BMI) OF 39.0 TO 39.9 IN ADULT (H): Primary | ICD-10-CM

## 2024-03-11 DIAGNOSIS — E66.812 CLASS 2 SEVERE OBESITY DUE TO EXCESS CALORIES WITH SERIOUS COMORBIDITY AND BODY MASS INDEX (BMI) OF 39.0 TO 39.9 IN ADULT (H): Primary | ICD-10-CM

## 2024-03-11 RX ORDER — SEMAGLUTIDE 0.25 MG/.5ML
0.25 INJECTION, SOLUTION SUBCUTANEOUS WEEKLY
Qty: 2 ML | Refills: 0 | Status: SHIPPED | OUTPATIENT
Start: 2024-03-11 | End: 2024-04-02

## 2024-03-11 NOTE — TELEPHONE ENCOUNTER
Patient agreeable to using [facility]. Will send patient mychart message with info re using compounding pharmacy.

## 2024-03-11 NOTE — TELEPHONE ENCOUNTER
General Call      Reason for Call: Plan #2 for [facility] meds    What are your questions or concerns:  Wegovy 0.25MG is still on back order with most pharmacies. Pt states that she would like to move to next discussed option of trying Zepbound.   Please advise on new RX.    [facility]    Date of last appointment with provider: 02/20/24    Could we send this information to you in [place] or would you prefer to receive a phone call?:   Patient would prefer a phone call     Okay to leave a detailed message?: Yes at Cell number on file:    Telephone Information:   Mobile [phone]         From OV 02/20/24

## 2024-03-11 NOTE — CONFIDENTIAL NOTE
She has previously tolerated Wegovy well.  Which she be willing to do the compounding pharmacy through [facility]?  This should still work with insurance.

## 2024-03-21 ENCOUNTER — NURSE TRIAGE (OUTPATIENT)
Dept: NURSING | Facility: CLINIC | Age: 44
End: 2024-03-21
Payer: COMMERCIAL

## 2024-03-21 DIAGNOSIS — E66.01 CLASS 2 SEVERE OBESITY DUE TO EXCESS CALORIES WITH SERIOUS COMORBIDITY AND BODY MASS INDEX (BMI) OF 39.0 TO 39.9 IN ADULT (H): Primary | ICD-10-CM

## 2024-03-21 DIAGNOSIS — E66.812 CLASS 2 SEVERE OBESITY DUE TO EXCESS CALORIES WITH SERIOUS COMORBIDITY AND BODY MASS INDEX (BMI) OF 39.0 TO 39.9 IN ADULT (H): Primary | ICD-10-CM

## 2024-03-21 NOTE — TELEPHONE ENCOUNTER
Patient just spoke with Mary regarding her Wegovy.  Patient states that Pharmacy does not have Wegovy and patient cannot afford out of pocket.  Plan B is to take Zep Bound a new version of Wegovy.  Please send a new prescription to the pharmacy for the Zep Bound.  Please phone patient when this has been completed.        Reason for Disposition   Caller has NON-URGENT medicine question about med that PCP or specialist prescribed and triager unable to answer question    Additional Information   Negative: Intentional drug overdose and suicidal thoughts or ideas   Negative: MORE THAN A DOUBLE DOSE of a prescription or over-the-counter (OTC) drug   Negative: DOUBLE DOSE (an extra dose or lesser amount) of prescription drug and any symptoms (e.g., dizziness, nausea, pain, sleepiness)   Negative: DOUBLE DOSE (an extra dose or lesser amount) of over-the-counter (OTC) drug and any symptoms (e.g., dizziness, nausea, pain, sleepiness)   Negative: Took another person's prescription drug   Negative: DOUBLE DOSE (an extra dose or lesser amount) of prescription drug and NO symptoms  (Exception: A double dose of antibiotics.)   Negative: Diabetes drug error or overdose (e.g., took wrong type of insulin or took extra dose)   Negative: Caller has medication question about med NOT prescribed by PCP and triager unable to answer question (e.g., compatibility with other med, storage)   Negative: Prescription not at pharmacy and was prescribed by PCP recently  (Exception: triager has access to EMR and prescription is recorded there. Go to Home Care and confirm for pharmacy.)   Negative: Pharmacy calling with prescription question and triager unable to answer question   Negative: Caller has URGENT medicine question about med that PCP or specialist prescribed and triager unable to answer question    Protocols used: Medication Question Call-A-[place]

## 2024-03-21 NOTE — TELEPHONE ENCOUNTER
"Noted from OV 2/20/24:  \" If Wegovy is not covered we would shift to Zepbound, medication she has not previously been on.  If both are not an option I think she does need some appetite regulation.  We would consider adding phentermine/topiramate. \"  "

## 2024-04-02 ENCOUNTER — TELEPHONE (OUTPATIENT)
Dept: FAMILY MEDICINE | Facility: CLINIC | Age: 44
End: 2024-04-02
Payer: COMMERCIAL

## 2024-04-02 NOTE — TELEPHONE ENCOUNTER
Prior Authorization Request  Who s requesting:  Pharmacy  Pharmacy Name and Location: [facility]  Medication Name: tirzepatide-Weight Management (ZEPBOUND) 2.5 MG/0.5ML prefilled pen   Insurance Plan: [place] [facility]  Insurance Member ID Number:  [id number]   CoverMyMeds Key:   Informed patient that prior authorizations can take up to 10 business days for response:   Yes  Okay to leave a detailed message: Yes

## 2024-04-02 NOTE — TELEPHONE ENCOUNTER
I am a bit confused about the question.  Please clarify the following:  - Was Wegovy approved?  Do we just need to find a pharmacy that has it?  - If Wegovy was approved, is a true that the compounding pharmacy is unable to process semaglutide on her behalf AND use insurance at the same time?  (I think all us providers need clarification on this question.  Originally we are told that they would be able to run these medications through insurance).  - Given how things have been the last couple weeks, please let the patient know that we are having at times unsure amount of old difficulties in getting Zepbound filled in pharmacies.  Many patients have started but have really struggled to get doses after the first month or 2.  This only seems to be getting worse.    My apologies to the patient for the delay.

## 2024-04-02 NOTE — TELEPHONE ENCOUNTER
- PA has not yet been initiated for wegovy.  - Compounding pharmacy is not able to bill insurance, it is self pay only. This is cost prohibitive to patient.  - Patient notified of providers comments. Her preferred pharmacy does not have stock of lower wegovy doses and she is not willing to drive to other pharmacies [place] and other nearby pharmacies are 45-60min away) to obtain. Her pharmacy confirms they have zepbound available.

## 2024-04-13 NOTE — TELEPHONE ENCOUNTER
PA Initiation    Medication: ZEPBOUND 2.5 MG/0.5ML SC SOAJ  Insurance Company: [employer] ([facility]) - Phone [phone] Fax [phone]  Pharmacy Filling the Rx: [facility] - [place] - [address]  Filling Pharmacy Phone: [phone]  Filling Pharmacy Fax: [phone]  Start Date: 4/13/2024

## 2024-04-15 NOTE — TELEPHONE ENCOUNTER
Prior Authorization Approval    Medication: ZEPBOUND 2.5 MG/0.5ML SC SOAJ  Authorization Effective Date: 4/15/2024  Authorization Expiration Date: 10/15/2024  Approved Dose/Quantity:   Reference #:     Insurance Company: [employer]+ ([facility]) - Phone [phone] Fax [phone]  Expected CoPay: $    CoPay Card Available:      Financial Assistance Needed:   Which Pharmacy is filling the prescription: [facility] - [place], [place] - [facility] [address]  Pharmacy Notified: YES  Patient Notified: **Instructed pharmacy to notify patient when script is ready to /ship.**    Per insurance rep case is approved, received approval info verbally.  Case ID [id number]

## 2024-04-15 NOTE — TELEPHONE ENCOUNTER
Per insurance rep the weight needs to be taken within the last 30 days. Please provide an updated weight for patient.

## 2024-05-17 DIAGNOSIS — E66.01 CLASS 2 SEVERE OBESITY DUE TO EXCESS CALORIES WITH SERIOUS COMORBIDITY AND BODY MASS INDEX (BMI) OF 39.0 TO 39.9 IN ADULT (H): ICD-10-CM

## 2024-05-17 DIAGNOSIS — E66.812 CLASS 2 SEVERE OBESITY DUE TO EXCESS CALORIES WITH SERIOUS COMORBIDITY AND BODY MASS INDEX (BMI) OF 39.0 TO 39.9 IN ADULT (H): ICD-10-CM

## 2024-05-17 NOTE — TELEPHONE ENCOUNTER
Pt states that she is tolerating current dose alright and is ready to move up to the next one if pcp advises it. Pt is taking her last injection this Sunday.    Is requesting fill early so she has time to get it without any issues.

## 2024-05-17 NOTE — TELEPHONE ENCOUNTER
"Left message informing patient requested prescription was sent into her pharmacy and encouraged to  ASAP due to extreme difficulty in some pharmacies not having in stock.  Requested return call if questions on message.      \"Luis Alberto Beach MD NS    5/17/24  4:11 PM           5 mg/week dosing sent to pharmacy.  I would encourage the patient to try to get this medicine is soon as possible as this dose has been extremely difficult to get from the pharmacies.\"             Scarlett Skinner RN  [facility]   "

## 2024-05-17 NOTE — CONFIDENTIAL NOTE
5 mg/week dosing sent to pharmacy.  I would encourage the patient to try to get this medicine is soon as possible as this dose has been extremely difficult to get from the pharmacies.

## 2024-05-24 ENCOUNTER — NURSE TRIAGE (OUTPATIENT)
Dept: NURSING | Facility: CLINIC | Age: 44
End: 2024-05-24
Payer: COMMERCIAL

## 2024-05-24 DIAGNOSIS — E66.01 CLASS 2 SEVERE OBESITY DUE TO EXCESS CALORIES WITH SERIOUS COMORBIDITY AND BODY MASS INDEX (BMI) OF 39.0 TO 39.9 IN ADULT (H): Primary | ICD-10-CM

## 2024-05-24 DIAGNOSIS — E66.812 CLASS 2 SEVERE OBESITY DUE TO EXCESS CALORIES WITH SERIOUS COMORBIDITY AND BODY MASS INDEX (BMI) OF 39.0 TO 39.9 IN ADULT (H): Primary | ICD-10-CM

## 2024-05-24 NOTE — TELEPHONE ENCOUNTER
Timothy Calling back to say that she can not find the 5.0 mg at any pharmacy.  She would like Dr Beach to advise on next steps.  Next dose due on Sunday.

## 2024-05-24 NOTE — TELEPHONE ENCOUNTER
"Unfortunately there is not really much of a \"next step.\"  Probably, the best we can do is to stay on 2.5 mg for another month if the pharmacy has a supply.  I sent this to the pharmacy.  "

## 2024-05-24 NOTE — TELEPHONE ENCOUNTER
Called patient and relayed message from Dr. Beach as written below.  Patient verbalized understanding and agreeable to continuing the 2.5 mg dose/week for another month.  She will call back if she has any other questions.        Scarlett Skinner RN  [facility]

## 2024-05-24 NOTE — TELEPHONE ENCOUNTER
"Nurse Triage SBAR    Is this a 2nd Level Triage? NO  Medication Availability -> Zepbound 5mg dose    Situation:   Pt's current pharmacy ([facility]) is unable to obtain 5 mg dose of Zepbound.    Background:   Voicemail message was left for pt 5/17/24 by clinic team, urging pt to contact her pharmacy and pursue new med/dose availability.  Pt had not done so.    Assessment:   Now pt calls to report her current pharmacy is unable to obtain the required dosing in time for pt's next scheduled injection (5/26/24).  States \"My next injection is due Mohsen, May 26th.\"  Pt has not yet attempted to contact other pharmacies to determine availability of this medication.  Exhorted pt to do so promptly.  Recommended contacting larger chain pharmacies such as [facility].  Pt verbalizes understanding.  Agrees to do so.  Pt agrees to call back to clinic after finding an alternate pharmacy, so that Rx can be re-transmitted.    Protocol Recommended Disposition:   Discuss With PCP And Callback By Nurse Within 1 Hour  Pt is made aware that her involvement is needed in pursuing alternate pharmacies.    Routed to provider for awareness (per pt's request).    Freya Trinh RN  [facility] Nurse Advisor     Reason for Disposition   Caller has URGENT medicine question about med that PCP or specialist prescribed and triager unable to answer question    Protocols used: Medication Question Call-A-OH    "

## 2024-06-24 DIAGNOSIS — E66.01 CLASS 2 SEVERE OBESITY DUE TO EXCESS CALORIES WITH SERIOUS COMORBIDITY AND BODY MASS INDEX (BMI) OF 39.0 TO 39.9 IN ADULT (H): Primary | ICD-10-CM

## 2024-06-24 DIAGNOSIS — Z86.32 HISTORY OF GESTATIONAL DIABETES: ICD-10-CM

## 2024-06-24 DIAGNOSIS — E66.812 CLASS 2 SEVERE OBESITY DUE TO EXCESS CALORIES WITH SERIOUS COMORBIDITY AND BODY MASS INDEX (BMI) OF 39.0 TO 39.9 IN ADULT (H): Primary | ICD-10-CM

## 2024-06-24 NOTE — TELEPHONE ENCOUNTER
Zepbound 7.5 mg dose prepped for review/approval, if appropriate.      Called patient to confirm Zepbound dose as patient had reported difficulty obtaining 5 mg week/dose on 5/24/24 and was going to continue on the 2.5 mg week/dose for another month (please see Nurse Triage encounter from 5/24/24 for further details).    Patient reports she was on Zepbound 2.5 mg/week dose for 2 months as she was unable to get the 5 mg dose until 2 weeks ago.  She has since been on the 5 mg/week dose of Zepbound for 2 weeks and has been tolerating the medication well, no side effects.  States she weighed herself this morning and so far she's down 8.5 lbs and down a pant size now, able to go up/down stairs easier and overall physically feeling better, since starting the Zepbound 2.5 months ago.          Scarlett Skinner RN  [facility]

## 2024-06-24 NOTE — TELEPHONE ENCOUNTER
General Call    Contacts       Contact Date/Time Type Contact Phone/Fax    06/24/2024 02:18 PM CDT Phone (Incoming) Timothy Oliveira (Self) [phone] (M)          Reason for Call:  Refill Request, Dose Increase    What are your questions or concerns:  Patient calling for refill and dose increase of Zepbound. She is on her last week of 5mg dose and has tolerated it well with no side effects.    Date of last appointment with provider: 2/20/2024    Could we send this information to you in F[employer] or would you prefer to receive a phone call?:   Patient would prefer a phone call   Okay to leave a detailed message?: Yes at Cell number on file:    Telephone Information:   Mobile [phone]

## 2024-07-09 ENCOUNTER — VIRTUAL VISIT (OUTPATIENT)
Dept: FAMILY MEDICINE | Facility: CLINIC | Age: 44
End: 2024-07-09
Payer: COMMERCIAL

## 2024-07-09 DIAGNOSIS — E66.01 CLASS 2 SEVERE OBESITY DUE TO EXCESS CALORIES WITH SERIOUS COMORBIDITY AND BODY MASS INDEX (BMI) OF 39.0 TO 39.9 IN ADULT (H): Primary | ICD-10-CM

## 2024-07-09 DIAGNOSIS — E66.812 CLASS 2 SEVERE OBESITY DUE TO EXCESS CALORIES WITH SERIOUS COMORBIDITY AND BODY MASS INDEX (BMI) OF 39.0 TO 39.9 IN ADULT (H): Primary | ICD-10-CM

## 2024-07-09 PROCEDURE — 99213 OFFICE O/P EST LOW 20 MIN: CPT | Mod: 95 | Performed by: FAMILY MEDICINE

## 2024-07-09 PROCEDURE — G2211 COMPLEX E/M VISIT ADD ON: HCPCS | Mod: 95 | Performed by: FAMILY MEDICINE

## 2024-07-09 RX ORDER — CYCLOBENZAPRINE HCL 10 MG
1 TABLET ORAL 3 TIMES DAILY PRN
COMMUNITY
Start: 2024-07-08

## 2024-07-09 RX ORDER — DULOXETIN HYDROCHLORIDE 60 MG/1
120 CAPSULE, DELAYED RELEASE ORAL DAILY
COMMUNITY
Start: 2024-06-25

## 2024-07-09 RX ORDER — TRAZODONE HYDROCHLORIDE 100 MG/1
100 TABLET ORAL AT BEDTIME
COMMUNITY
Start: 2024-06-25

## 2024-07-09 ASSESSMENT — PATIENT HEALTH QUESTIONNAIRE - PHQ9: SUM OF ALL RESPONSES TO PHQ QUESTIONS 1-9: 17

## 2024-07-09 NOTE — PROGRESS NOTES
Timothy is a 43 year old who is being evaluated via a billable video visit.    How would you like to obtain your AVS? MyChart  If the video visit is dropped, the invitation should be resent by: Send to e-mail at: [email or web address]  Will anyone else be joining your video visit? No    Assessment & Plan   Problem List Items Addressed This Visit       Class 2 severe obesity due to excess calories with serious comorbidity and body mass index (BMI) of 39.0 to 39.9 in adult (H) - Primary     43 year old year old female in clinic today to discuss treatment of the following conditions through diet and lifestyle modification and weight loss:  1. Class 2 severe obesity due to excess calories with serious comorbidity and body mass index (BMI) of 39.0 to 39.9 in adult (H)      The patient's weight loss result since the last visit was successful based on initial response to tirzepatide. Minimal side effects. Doing okay.  Depression management through PCP.     BMI: There is no height or weight on file to calculate BMI.  Ideal body weight: 47.8 kg (105 lb 6.1 oz)  Adjusted ideal body weight: 66.2 kg (145 lb 15.8 oz)    Current therapies:    Medications: YES Zepbound   - Starting weight for GLP1 receptor agonist: ~210s   - Total weight loss: 10 lbs (per home scale) (5%)    Nutritional goals/strategies: reviewed.   - caloric restriction: Yes   - time restriction: NO   - diet (macronutrient) framework: high protein/low carbohydrate      Movement:   - works with large animals.     Sleep:    - fragmented    Follow-up: 3 months             The longitudinal plan of care for the diagnosis(es)/condition(s) as documented were addressed during this visit. Due to the added complexity in care, I will continue to support Timothy in the subsequent management and with ongoing continuity of care.       Depression Screening Follow Up        Follow Up Actions Taken  Patient counseled, no additional follow up at this time.   Mental health managed by  "PCP.    Kaleb   Timothy is a 43 year old, presenting for the following health issues:  Weight Loss (Follow-up /)        7/9/2024    12:39 PM   Additional Questions   Roomed by xl     Patient presents for treatment of chronic, comorbid conditions using intensive therapeutic lifestyle interventions including nutrition, physical activity, and behavior management.  - she notes that she is down about 10 lbs. She is going to start 7.5 mg in the week or so.   - successes: making some progress toward weight loss goal.   - struggles: she is taking time to eat. Baby. House guests.      - mood: she reports that she is doing okay. \"As good as it can be.\" \"Happy and loving thoughts.\" Baby is now sleeping through the night. Sleep has been fragmented.  Worse the past couple of nights.    - movement: Working as a .  Body has acclimated to this type of work.     - tracking/journaling: ad jarad.  Portion controlled. More fruit.  Protein for dinner.    - following nutritional plan: yes.     - hunger: Stable.    - medication benefits: lowering weight. side effects: nausea           12/23/2022    10:31 AM 2/20/2024     9:00 AM 7/9/2024    12:45 PM   PHQ   PHQ-9 Total Score 16 22 17   Q9: Thoughts of better off dead/self-harm past 2 weeks Not at all Not at all Not at all         Objective    Vitals - Patient Reported  Weight (Patient Reported): 91.1 kg (200 lb 12.8 oz)    Physical Exam   GENERAL: alert and no distress  EYES: Eyes grossly normal to inspection.  No discharge or erythema, or obvious scleral/conjunctival abnormalities.  RESP: No audible wheeze, cough, or visible cyanosis.    SKIN: Visible skin clear. No significant rash, abnormal pigmentation or lesions.  NEURO: Cranial nerves grossly intact.  Mentation and speech appropriate for age.  PSYCH: Appropriate affect, tone, and pace of words      Video-Visit Details    Type of service:  Video Visit   Originating Location (pt. Location): Home    Distant Location " (provider location):  On-site  Platform used for Video Visit: Allison  Signed Electronically by: Luis Alberto Beach MD

## 2024-07-09 NOTE — ASSESSMENT & PLAN NOTE
43 year old year old female in clinic today to discuss treatment of the following conditions through diet and lifestyle modification and weight loss:  1. Class 2 severe obesity due to excess calories with serious comorbidity and body mass index (BMI) of 39.0 to 39.9 in adult (H)      The patient's weight loss result since the last visit was successful based on initial response to tirzepatide. Minimal side effects. Doing okay.  Depression management through PCP.     BMI: There is no height or weight on file to calculate BMI.  Ideal body weight: 47.8 kg (105 lb 6.1 oz)  Adjusted ideal body weight: 66.2 kg (145 lb 15.8 oz)    Current therapies:    Medications: YES Zepbound   - Starting weight for GLP1 receptor agonist: ~210s   - Total weight loss: 10 lbs (per home scale) (5%)    Nutritional goals/strategies: reviewed.   - caloric restriction: Yes   - time restriction: NO   - diet (macronutrient) framework: high protein/low carbohydrate      Movement:   - works with large animals.     Sleep:    - fragmented    Follow-up: 3 months     Topical Retinoid counseling:  Patient advised to apply a pea-sized amount only at bedtime and wait 30 minutes after washing their face before applying.  If too drying, patient may add a non-comedogenic moisturizer. The patient verbalized understanding of the proper use and possible adverse effects of retinoids.  All of the patient's questions and concerns were addressed.

## 2024-08-08 DIAGNOSIS — Z86.32 HISTORY OF GESTATIONAL DIABETES: ICD-10-CM

## 2024-08-08 DIAGNOSIS — E66.812 CLASS 2 SEVERE OBESITY DUE TO EXCESS CALORIES WITH SERIOUS COMORBIDITY AND BODY MASS INDEX (BMI) OF 39.0 TO 39.9 IN ADULT (H): ICD-10-CM

## 2024-08-08 DIAGNOSIS — E66.01 CLASS 2 SEVERE OBESITY DUE TO EXCESS CALORIES WITH SERIOUS COMORBIDITY AND BODY MASS INDEX (BMI) OF 39.0 TO 39.9 IN ADULT (H): ICD-10-CM

## 2024-09-16 ENCOUNTER — TELEPHONE (OUTPATIENT)
Dept: FAMILY MEDICINE | Facility: CLINIC | Age: 44
End: 2024-09-16
Payer: COMMERCIAL

## 2024-09-16 DIAGNOSIS — R11.0 NAUSEA: ICD-10-CM

## 2024-09-16 DIAGNOSIS — E66.812 CLASS 2 SEVERE OBESITY DUE TO EXCESS CALORIES WITH SERIOUS COMORBIDITY AND BODY MASS INDEX (BMI) OF 39.0 TO 39.9 IN ADULT (H): Primary | ICD-10-CM

## 2024-09-16 DIAGNOSIS — E66.01 CLASS 2 SEVERE OBESITY DUE TO EXCESS CALORIES WITH SERIOUS COMORBIDITY AND BODY MASS INDEX (BMI) OF 39.0 TO 39.9 IN ADULT (H): Primary | ICD-10-CM

## 2024-09-16 NOTE — TELEPHONE ENCOUNTER
Please advise on next step.  Symptoms    Describe your symptoms: Took 3rd dose of 12.5 Zepbound.  Every dose of 12.5 side effects have worsened. Side effects noted:  dizzy, lightheaded, sweats confusion slurred speech, shakiness  feeling gittery some nausea..  This has been getting worse since beginning the 12.5.  Today she took 3rd dose.  Not losing weight since beginning the 12.5.    Any pain: No    How long have you been having symptoms: 3  weeks, since starting the 12.5 mg dose of Zepbound    Have you been seen for this:  Yes:     Appointment offered?: No- next appointment on 10/1/24    Triage offered?: No        Preferred Pharmacy:   [facility] - [place] - [address]  [address] [address]  Phone: [phone] Fax: [phone]          Could we send this information to you in [facility] or would you prefer to receive a phone call?:   Patient would prefer a phone call   Okay to leave a detailed message?: Yes at Cell number on file:    Telephone Information:   Mobile [phone]

## 2024-09-17 RX ORDER — ONDANSETRON 4 MG/1
4 TABLET, ORALLY DISINTEGRATING ORAL EVERY 8 HOURS PRN
Qty: 30 TABLET | Refills: 1 | Status: SHIPPED | OUTPATIENT
Start: 2024-09-17

## 2024-09-18 NOTE — CONFIDENTIAL NOTE
After Visit Summary   8/28/2018    Jesse Guerrero    MRN: [id number]           Patient Information     Date Of Birth          1941        Visit Information        Provider Department      8/28/2018 3:00 PM Rubi Estrada MD; [facility]        Today's Diagnoses     Pseudophakia of right eye    -  1    NVG (neovascular glaucoma), left, stage unspecified          Care Instructions    Patient will continue on Cosopt (Timolol/Dorzolamide) which is a blue top drop 2x/day (12 hours apart) in the LEFT EYE ONLY and Alphagan (Brimonidine) which is a purple top drop 2x/day (12 hours apart) in the LEFT EYE ONLY.  Patient will follow up with Optometry for new glasses prescription.    Patient will return to clinic in 3-4 months with repeat IOP check and dilated eye exam.             Follow-ups after your visit        Follow-up notes from your care team     Return 3-4 months with repeat IOP check and dilated eye exam.   .      Your next 10 appointments already scheduled     Nov 27, 2018  2:00 PM CST   RETURN GLAUCOMA with Rubi Estrada MD   [facility] Clinic ([facility])    [address] [facility] [address] [place] [id number] [phone]              Who to contact     Please call your clinic at [phone] to:    Ask questions about your health    Make or cancel appointments    Discuss your medicines    Learn about your test results    Speak to your doctor            Additional Information About Your Visit        Care EveryWhere ID     This is your Care EveryWhere ID. This could be used by other organizations to access your [facility] medical records  [id number]         Blood Pressure from Last 3 Encounters:   07/21/18 (!) 191/105    Weight from Last 3 Encounters:   07/21/18 68.5 kg (151 lb)              Today, you had the following     No orders found for display         Today's Medication Changes          These changes are    Recommend clinic visit; sooner than 10/1 if necessary    Let's decrease dose to 10 mg for a month    Zofran refill sent.    accurate as of 8/28/18  4:53 PM.  If you have any questions, ask your nurse or doctor.               Stop taking these medicines if you haven't already. Please contact your care team if you have questions.     enalapril 20 MG tablet   Commonly known as:  VASOTEC   Stopped by:  Rubi Estrada MD           hydrochlorothiazide 12.5 MG Tabs tablet   Stopped by:  Rubi Estrada MD                    Primary Care Provider Fax #    Physician No Ref-Primary [phone]       No address on file        Equal Access to Services     [facility]: Hadii aad ku hadasho Soomaali, waaxda luqadaha, qaybta kaalmada adeegyada, waxay carlozin hayomairan kody brito . So [facility] [phone].    ATENCIÓN: Si habla español, tiene a gilman disposición servicios gratuitos de asistencia lingüística. Llame al [phone].    We comply with applicable federal civil rights laws and [place] laws. We do not discriminate on the basis of race, color, national origin, age, disability, sex, sexual orientation, or gender identity.            Thank you!     Thank you for choosing [facility]  for your care. Our goal is always to provide you with excellent care. Hearing back from our patients is one way we can continue to improve our services. Please take a few minutes to complete the written survey that you may receive in the mail after your visit with us. Thank you!             Your Updated Medication List - Protect others around you: Learn how to safely use, store and throw away your medicines at www.disposemymeds.org.          This list is accurate as of 8/28/18  4:53 PM.  Always use your most recent med list.                   Brand Name Dispense Instructions for use Diagnosis    atropine 1 % ophthalmic solution     1 Bottle    Place 1-2 drops Into the left eye 2 times daily    Post-operative state       * brimonidine 0.15 % ophthalmic solution    ALPHAGAN-P     Place 1 drop Into the left eye 3 times daily        * brimonidine 0.2 %  ophthalmic solution    ALPHAGAN    15 mL    Place 1 drop Into the left eye 3 times daily    NVG (neovascular glaucoma), left, stage unspecified       * dorzolamide-timolol 2-0.5 % ophthalmic solution    COSOPT     Place 1 drop Into the left eye 2 times daily        * dorzolamide-timolol 2-0.5 % ophthalmic solution    COSOPT    1 Bottle    Place 1 drop Into the left eye 2 times daily    NVG (neovascular glaucoma), left, stage unspecified       prednisoLONE acetate 1 % ophthalmic susp    PRED FORTE     Place 1 drop Into the left eye 4 times daily        * Notice:  This list has 4 medication(s) that are the same as other medications prescribed for you. Read the directions carefully, and ask your doctor or other care provider to review them with you.

## 2024-10-01 ENCOUNTER — OFFICE VISIT (OUTPATIENT)
Dept: FAMILY MEDICINE | Facility: CLINIC | Age: 44
End: 2024-10-01
Payer: COMMERCIAL

## 2024-10-01 VITALS
HEART RATE: 89 BPM | TEMPERATURE: 98.4 F | RESPIRATION RATE: 16 BRPM | SYSTOLIC BLOOD PRESSURE: 102 MMHG | OXYGEN SATURATION: 97 % | DIASTOLIC BLOOD PRESSURE: 72 MMHG | HEIGHT: 61 IN | BODY MASS INDEX: 34.87 KG/M2 | WEIGHT: 184.7 LBS

## 2024-10-01 DIAGNOSIS — E66.811 CLASS 1 OBESITY DUE TO EXCESS CALORIES WITH SERIOUS COMORBIDITY AND BODY MASS INDEX (BMI) OF 34.0 TO 34.9 IN ADULT: Primary | ICD-10-CM

## 2024-10-01 DIAGNOSIS — E66.09 CLASS 1 OBESITY DUE TO EXCESS CALORIES WITH SERIOUS COMORBIDITY AND BODY MASS INDEX (BMI) OF 34.0 TO 34.9 IN ADULT: Primary | ICD-10-CM

## 2024-10-01 PROCEDURE — 99214 OFFICE O/P EST MOD 30 MIN: CPT | Performed by: FAMILY MEDICINE

## 2024-10-01 PROCEDURE — G2211 COMPLEX E/M VISIT ADD ON: HCPCS | Performed by: FAMILY MEDICINE

## 2024-10-01 RX ORDER — ARIPIPRAZOLE 2 MG/1
1 TABLET ORAL DAILY
COMMUNITY
Start: 2024-07-30

## 2024-10-01 ASSESSMENT — PATIENT HEALTH QUESTIONNAIRE - PHQ9
SUM OF ALL RESPONSES TO PHQ QUESTIONS 1-9: 17
SUM OF ALL RESPONSES TO PHQ QUESTIONS 1-9: 17
10. IF YOU CHECKED OFF ANY PROBLEMS, HOW DIFFICULT HAVE THESE PROBLEMS MADE IT FOR YOU TO DO YOUR WORK, TAKE CARE OF THINGS AT HOME, OR GET ALONG WITH OTHER PEOPLE: EXTREMELY DIFFICULT

## 2024-10-01 NOTE — ASSESSMENT & PLAN NOTE
43 year old year old female in clinic today to discuss treatment of the following conditions through diet and lifestyle modification and weight loss:  1. Class 1 obesity due to excess calories with serious comorbidity and body mass index (BMI) of 34.0 to 34.9 in adult      The patient's weight loss result since the last visit was successful based on modest weight loss.  She expresses some frustration that she has not lost more weight.  She found Wegovy to be much more effective.  Unfortunately, her insurance will only cover ZeAzzure ITound.  Mental health has been difficult.  She started aripiprazole a couple of months ago.  We reviewed that this medicine, theoretically, has some weight gaining properties.  Financial stress has been high as well.  These are barriers to weight loss.  We reviewed nutrition.  If anything, I am concerned that she is actually a bit too low and overall calories.  If at all possible she will add protein earlier in the day.  We will continue 10 mg tirzepatide as her side effects have subsided at that lower dose.  Will plan to recheck in 3 months or so.

## 2024-11-04 DIAGNOSIS — R11.0 NAUSEA: ICD-10-CM

## 2024-11-04 RX ORDER — ONDANSETRON 4 MG/1
TABLET, ORALLY DISINTEGRATING ORAL
Qty: 30 TABLET | Refills: 1 | Status: SHIPPED | OUTPATIENT
Start: 2024-11-04

## 2024-11-07 ENCOUNTER — TELEPHONE (OUTPATIENT)
Dept: FAMILY MEDICINE | Facility: CLINIC | Age: 44
End: 2024-11-07
Payer: COMMERCIAL

## 2024-11-07 NOTE — TELEPHONE ENCOUNTER
Prior Authorization Request  Who s requesting:  Pharmacy  Pharmacy Name and Location: [facility]  Medication Name: tirzepatide-Weight Management (ZEPBOUND) 10 MG/0.5ML prefilled pen   Insurance Plan: [employer] [facility]  Insurance Member ID Number:  [id number]   CoverMyMeds Key:   Informed patient that prior authorizations can take up to 10 business days for response:   NA  Okay to leave a detailed message: No

## 2024-11-07 NOTE — LETTER
November 27, 2024      Bridgett Oliveira  [address] [facility] [address]        To Whom It May Concern,     This individual has been under my care for a couple of years.  She is currently on a GLP-1 receptor agonist for medical weight loss.  As you can see from the insurance claims, we attempted to go up to 12.5 mg/week of Zepbound earlier this year.  Unfortunately, she had intolerable side effects and we dropped her dose back to 10 mg.  Adjustments of this type is commonly done with this medication.  Now that she has been on 10 mg for a few more months she might be able to tolerate a higher dose as we seek to maximize the benefit of this medicine.  For this reason, we have requested a second month of 12.5 mg/week.  We would then attempt to go up to 15 mg/week of this medication.  I think it is likely that she will be able to tolerate 12.5 mg but not 15 mg.  For this reason, I would also ask that you consider covering 12.5 mg on an ongoing basis as a maintenance medicine.  I understand that the studies on tirzepatide to demonstrate efficacy of weight loss used 5 mg/week, 10 mg/week or 15 mg/week.  In clinic I have had patients who have done well on the transitional doses.  I believe this is consistent with the standard of care and community and given the safety profile of this medication on both 10 mg and 15 mg that 12.5 mg/week would also be safe for this individual.  Please consider        Sincerely,        Luis Alberto Beach MD

## 2024-11-11 NOTE — TELEPHONE ENCOUNTER
Left message to call back for: Patient  Information to relay to patient: Please help patient schedule nurse-only visit for weight check.

## 2024-11-18 ENCOUNTER — ALLIED HEALTH/NURSE VISIT (OUTPATIENT)
Dept: FAMILY MEDICINE | Facility: CLINIC | Age: 44
End: 2024-11-18
Payer: COMMERCIAL

## 2024-11-18 VITALS — HEIGHT: 61 IN | BODY MASS INDEX: 34.26 KG/M2 | WEIGHT: 181.44 LBS

## 2024-11-18 DIAGNOSIS — E66.09 CLASS 1 OBESITY DUE TO EXCESS CALORIES WITH SERIOUS COMORBIDITY AND BODY MASS INDEX (BMI) OF 34.0 TO 34.9 IN ADULT: Primary | ICD-10-CM

## 2024-11-18 DIAGNOSIS — E66.811 CLASS 1 OBESITY DUE TO EXCESS CALORIES WITH SERIOUS COMORBIDITY AND BODY MASS INDEX (BMI) OF 34.0 TO 34.9 IN ADULT: Primary | ICD-10-CM

## 2024-11-18 PROCEDURE — 99207 PR NO CHARGE NURSE ONLY: CPT

## 2024-11-18 NOTE — PROGRESS NOTES
Patient came into clinic today for updated height and weight check for PA for 10 mg Zepbound dose.  Patient states she has been on the 10 mg dose the last 3 months and tolerating well without any side effects, and feels ready to move up to the 12.5 mg dose.  She states she just took her last 10 mg injection 11/17/24 and due to take her next dose 11/24/24.    Zepbound 12.5 mg prepped below for review/approval, if appropriate.       Scarlett Skinner RN  [facility]

## 2024-11-18 NOTE — Clinical Note
Updated height/weight for Zepbound PA.  Please advise on patient's request to move up to next dose, 12.5 mg.  Thank you!

## 2024-11-20 NOTE — TELEPHONE ENCOUNTER
PA Initiation    Medication: ZEPBOUND 12.5 MG/0.5ML SC SOAJ  Insurance Company: [email or web address] ([facility]) - Phone [phone] Fax [phone]  Pharmacy Filling the Rx: [facility] - [place], [place] - [address]  Filling Pharmacy Phone: [phone]  Filling Pharmacy Fax: [phone]  Start Date: 11/20/2024    PA FORMS (PA REQUEST AND ANTI-OBESITY) FAXED TO [facility]

## 2024-11-25 NOTE — TELEPHONE ENCOUNTER
Followed up on status of PA. This was received on 11/21/24, currently pending review, turnaround time 7-10 business days. I will follow up again. Case Id: [id number].

## 2024-11-27 ENCOUNTER — MYC MEDICAL ADVICE (OUTPATIENT)
Dept: FAMILY MEDICINE | Facility: CLINIC | Age: 44
End: 2024-11-27
Payer: COMMERCIAL

## 2024-11-27 DIAGNOSIS — E66.09 CLASS 1 OBESITY DUE TO EXCESS CALORIES WITH SERIOUS COMORBIDITY AND BODY MASS INDEX (BMI) OF 34.0 TO 34.9 IN ADULT: ICD-10-CM

## 2024-11-27 DIAGNOSIS — E66.811 CLASS 1 OBESITY DUE TO EXCESS CALORIES WITH SERIOUS COMORBIDITY AND BODY MASS INDEX (BMI) OF 34.0 TO 34.9 IN ADULT: ICD-10-CM

## 2024-11-27 NOTE — CONFIDENTIAL NOTE
The patient should have 10 mg/week dosing available to her.  If not, I can send a refill.  I think it is worth filing appeal.  Letter generated.  Please file appeal using the attached letter.

## 2024-11-27 NOTE — TELEPHONE ENCOUNTER
"Followed up on PA status. [facility] stated they mailed additional questions out to clinic today. They will not fax the additional questions to me \"due to HIPAA\". Once the clinic receives the letter, we will need to send the additional info back to them for further review. They are stating that 12.5mg is not an appropriate maintenance dose. Provider needs to provide rationale why patient needs to be at 12.5mg, instead of 5, 10 or 15mg.  "

## 2024-12-02 NOTE — TELEPHONE ENCOUNTER
Patient calling to report that she does not have 10mg dose available. She is requesting refill on 10mg dose until appeal can be processed on 12.5mg dose.

## 2024-12-03 ENCOUNTER — TELEPHONE (OUTPATIENT)
Dept: FAMILY MEDICINE | Facility: CLINIC | Age: 44
End: 2024-12-03

## 2024-12-03 NOTE — TELEPHONE ENCOUNTER
Prior Authorization Request  Who s requesting:  Pharmacy  [facility] and [facility]  Medication Name: tirzepatide-Weight Management (ZEPBOUND) 10 MG/0.5ML prefilled pen   Insurance Plan: [facility] Medicaid  Insurance Member ID Number:  [id number]   CoverMyMeds Key:   Informed patient that prior authorizations can take up to 10 business days for response:   NA  Okay to leave a detailed message: N/A    See 11/7/24 PA encounter for 12.5mg dose. Provider has sent in 10mg and pharmacy is reporting PA is now needed for that dose as well.

## 2024-12-05 NOTE — TELEPHONE ENCOUNTER
PA Initiation    Medication: ZEPBOUND 10 MG/0.5ML SC SOAJ  Insurance Company: [employer] ([facility]) - Phone [phone] Fax [phone]  Pharmacy Filling the Rx: [facility] - [place], [place] - [address]  Filling Pharmacy Phone: [phone]  Filling Pharmacy Fax: [phone]  Start Date: 12/5/2024    PA REQUEST AND ANTI-OBESITY REQUEST FORMS FAXED TO [employer]

## 2024-12-11 NOTE — TELEPHONE ENCOUNTER
Followed up PA status. They received PA forms on  12/6/24. Currently being reviewed. Turnaround time could take up to 20 business days from 12/6/24 per rep. Case Id: [id number]

## 2024-12-20 NOTE — TELEPHONE ENCOUNTER
Prior Authorization Approval    Medication: ZEPBOUND 10 MG/0.5ML SC SOAJ  Authorization Effective Date: 12/6/2024  Authorization Expiration Date: 6/6/2025  Approved Dose/Quantity:   Approved 12/6/24-6/6/25, Qty 2ml per 28 days    Reference #:     Insurance Company: [employer] ([facility]) - Phone [phone] Fax [phone]  Expected CoPay: $    CoPay Card Available:      Financial Assistance Needed:   Which Pharmacy is filling the prescription: [facility] - [place], [place] - [facility] [address]  Pharmacy Notified: YES  Pharmacy has a paid claim.    Patient Notified: Instructed pharmacy to notify patient once order is ready.

## 2024-12-31 DIAGNOSIS — E66.09 CLASS 1 OBESITY DUE TO EXCESS CALORIES WITH SERIOUS COMORBIDITY AND BODY MASS INDEX (BMI) OF 34.0 TO 34.9 IN ADULT: Primary | ICD-10-CM

## 2024-12-31 DIAGNOSIS — E66.811 CLASS 1 OBESITY DUE TO EXCESS CALORIES WITH SERIOUS COMORBIDITY AND BODY MASS INDEX (BMI) OF 34.0 TO 34.9 IN ADULT: Primary | ICD-10-CM

## 2024-12-31 NOTE — TELEPHONE ENCOUNTER
General Call    Contacts       Contact Date/Time Type Contact Phone/Fax    12/31/2024 10:00 AM CST Phone (Incoming) Tee Timothy VILA (Self) [phone] (M)          Reason for Call:  Refill, Dose Increase    What are your questions or concerns:  Patient requesting refill of Zepbound. Patient has restarted Zepbound after PA delay and has taken 12.5mg for three doses. She reports she has tolerated well and would like to increase to 15mg dose.    Date of last appointment with provider: 10/1/24    Could we send this information to you in [employer] or would you prefer to receive a phone call?:   Patient would prefer a phone call   Okay to leave a detailed message?: Yes at Cell number on file:    Telephone Information:   Mobile [phone]

## 2025-01-19 DIAGNOSIS — E66.09 CLASS 1 OBESITY DUE TO EXCESS CALORIES WITH SERIOUS COMORBIDITY AND BODY MASS INDEX (BMI) OF 34.0 TO 34.9 IN ADULT: ICD-10-CM

## 2025-01-19 DIAGNOSIS — E66.811 CLASS 1 OBESITY DUE TO EXCESS CALORIES WITH SERIOUS COMORBIDITY AND BODY MASS INDEX (BMI) OF 34.0 TO 34.9 IN ADULT: ICD-10-CM

## 2025-01-20 RX ORDER — TIRZEPATIDE 15 MG/.5ML
INJECTION, SOLUTION SUBCUTANEOUS
Qty: 2 ML | Refills: 5 | Status: SHIPPED | OUTPATIENT
Start: 2025-01-20

## 2025-01-20 NOTE — TELEPHONE ENCOUNTER
Incoming call from patient inquiring update on Zepbound refill request as her pharmacy hasn't received the prescription.  RN informed Dr. Beach just sent the prescription to her pharmacy this morning and to contact pharmacy for refill status.  Patient also aware she's due for a weight loss follow up appointment with Dr. Beach and a virtual visit was scheduled for 1/30/25 at 9:00am.  Encouraged to call back with any other questions/concerns.        Scarlett Skinner RN  [facility]

## 2025-01-30 ENCOUNTER — VIRTUAL VISIT (OUTPATIENT)
Dept: FAMILY MEDICINE | Facility: CLINIC | Age: 45
End: 2025-01-30
Payer: COMMERCIAL

## 2025-01-30 DIAGNOSIS — E66.09 CLASS 1 OBESITY DUE TO EXCESS CALORIES WITH SERIOUS COMORBIDITY AND BODY MASS INDEX (BMI) OF 34.0 TO 34.9 IN ADULT: ICD-10-CM

## 2025-01-30 DIAGNOSIS — E66.811 CLASS 1 OBESITY DUE TO EXCESS CALORIES WITH SERIOUS COMORBIDITY AND BODY MASS INDEX (BMI) OF 34.0 TO 34.9 IN ADULT: ICD-10-CM

## 2025-01-30 RX ORDER — OLOPATADINE HYDROCHLORIDE 2 MG/ML
1 SOLUTION/ DROPS OPHTHALMIC
COMMUNITY
Start: 2024-11-01

## 2025-01-30 NOTE — PROGRESS NOTES
Timothy is a 44 year old who is being evaluated via a billable video visit.    How would you like to obtain your AVS? Mail a copy  If the video visit is dropped, the invitation should be resent by: Send to e-mail at: [email or web address]  Will anyone else be joining your video visit? No      Assessment & Plan     Assessment & Plan  Class 1 obesity due to excess calories with serious comorbidity and body mass index (BMI) of 34.0 to 34.9 in adult  44 year old year old female in clinic today to discuss treatment of the following conditions through diet and lifestyle modification and weight loss:  1. Class 1 obesity due to excess calories with serious comorbidity and body mass index (BMI) of 34.0 to 34.9 in adult      The patient's weight loss result since the last visit was mixed based on overall response to the medication.  She is down at least 28 pounds.  This corresponds to 13.5%.  Her frame of reference is the exceptional weight loss that she lost while on Wegovy prior to having a child a couple of years ago.  Physical activity is limited by the season as well as her anxiety/depression.  She does take a number of medicines that have weight gaining potential.  She is planning on following up with her primary team to review mental health plan.  There is a question of insurance coverage.  I fear that she may be cut off after 12 months of the medicine.  She will call her insurance plan.  Compounded semaglutide might be an alternative option if it is affordable for her.  Nutrition is actually quite well.  Physical activity is within the home as relates to activities of daily living and childcare.  I suggested more focused physical activity.     BMI: There is no height or weight on file to calculate BMI.  Ideal body weight: 46.6 kg (102 lb 13.5 oz)  Adjusted ideal body weight: 60.9 kg (134 lb 4.5 oz)    Current therapies:    Medications: YES tirzepatide   - Starting weight for GLP1 receptor agonist: 206 pounds   - Total  weight loss: 28 lbs (13.5%)    Nutritional goals/strategies: reviewed.   - caloric restriction: Yes   - time restriction: NO   - diet (macronutrient) framework: high protein/low carbohydrate     Movement:   - predominantly cardiovascular    Distress (stress, cortisol, etc): High and disrupted.    Follow-up: 6 months, unless her insurance changes and we need to develop an alternative plan.  I would start compounded semaglutide via the MyChart system.  If we need to switch to an oral antiobesity medicine we would need a clinic visit to review.  Many of these would likely conflict with her mental health treatment plan.           The longitudinal plan of care for the diagnosis(es)/condition(s) as documented were addressed during this visit. Due to the added complexity in care, I will continue to support Timothy in the subsequent management and with ongoing continuity of care.      Subjective   Timothy is a 44 year old, presenting for the following health issues:  Weight Loss (Follow-up )        1/30/2025     8:45 AM   Additional Questions   Roomed by xl     Video Start Time: 9:31 AM    Patient presents for treatment of chronic, comorbid conditions using intensive therapeutic lifestyle interventions including nutrition, physical activity, and behavior management.   - successes: generally eating well. She focuses on protein.  She thinks that she is doing a good job with portion control.  Descent veggie portion.  Energy has been down.    - struggles: depression and anxiety have been severe.  She is planning to meet with PCP.    - movement: stairs and parenting.    - tracking/journaling: ad lob   - following nutritional plan: yes.  Deviations from plan: rare carb sides.    - hunger: Stable.    - medication benefits: helpful. Side effects: rash             Objective    Vitals - Patient Reported  Weight (Patient Reported): 80.7 kg (178 lb)      Vitals:  No vitals were obtained today due to virtual visit.    Physical Exam    GENERAL: alert and no distress  EYES: Eyes grossly normal to inspection.  No discharge or erythema, or obvious scleral/conjunctival abnormalities.  RESP: No audible wheeze, cough, or visible cyanosis.    SKIN: Visible skin clear. No significant rash, abnormal pigmentation or lesions.  NEURO: Cranial nerves grossly intact.  Mentation and speech appropriate for age.  PSYCH: Appropriate affect, tone, and pace of words      Video-Visit Details    Type of service:  Video Visit   Video End Time:9:50 AM  Originating Location (pt. Location): Home    Distant Location (provider location):  On-site  Platform used for Video Visit: Allison  Signed Electronically by: Luis Alberto Beach MD

## 2025-01-30 NOTE — ASSESSMENT & PLAN NOTE
44 year old year old female in clinic today to discuss treatment of the following conditions through diet and lifestyle modification and weight loss:  1. Class 1 obesity due to excess calories with serious comorbidity and body mass index (BMI) of 34.0 to 34.9 in adult      The patient's weight loss result since the last visit was mixed based on overall response to the medication.  She is down at least 28 pounds.  This corresponds to 13.5%.  Her frame of reference is the exceptional weight loss that she lost while on Wegovy prior to having a child a couple of years ago.  Physical activity is limited by the season as well as her anxiety/depression.  She does take a number of medicines that have weight gaining potential.  She is planning on following up with her primary team to review mental health plan.  There is a question of insurance coverage.  I fear that she may be cut off after 12 months of the medicine.  She will call her insurance plan.  Compounded semaglutide might be an alternative option if it is affordable for her.  Nutrition is actually quite well.  Physical activity is within the home as relates to activities of daily living and childcare.  I suggested more focused physical activity.     BMI: There is no height or weight on file to calculate BMI.  Ideal body weight: 46.6 kg (102 lb 13.5 oz)  Adjusted ideal body weight: 60.9 kg (134 lb 4.5 oz)    Current therapies:    Medications: YES tirzepatide   - Starting weight for GLP1 receptor agonist: 206 pounds   - Total weight loss: 28 lbs (13.5%)    Nutritional goals/strategies: reviewed.   - caloric restriction: Yes   - time restriction: NO   - diet (macronutrient) framework: high protein/low carbohydrate     Movement:   - predominantly cardiovascular    Distress (stress, cortisol, etc): High and disrupted.    Follow-up: 6 months, unless her insurance changes and we need to develop an alternative plan.  I would start compounded semaglutide via the [employer]  system.  If we need to switch to an oral antiobesity medicine we would need a clinic visit to review.  Many of these would likely conflict with her mental health treatment plan.

## 2025-03-16 ENCOUNTER — HEALTH MAINTENANCE LETTER (OUTPATIENT)
Age: 45
End: 2025-03-16

## 2025-06-27 ENCOUNTER — TELEPHONE (OUTPATIENT)
Dept: FAMILY MEDICINE | Facility: CLINIC | Age: 45
End: 2025-06-27
Payer: COMMERCIAL

## 2025-06-27 NOTE — TELEPHONE ENCOUNTER
Prior Authorization Request  Who s requesting:  Pharmacy  [facility]  Medication Name: tirzepatide-Weight Management (ZEPBOUND) 15 MG/0.5ML prefilled pen   Insurance Plan: [employer] [facility]  Insurance Member ID Number:  [id number]   CoverMyMeds Key:   Informed patient that prior authorizations can take up to 10 business days for response:   NA  Okay to leave a detailed message: N/A

## 2025-07-02 ENCOUNTER — ALLIED HEALTH/NURSE VISIT (OUTPATIENT)
Dept: FAMILY MEDICINE | Facility: CLINIC | Age: 45
End: 2025-07-02
Payer: COMMERCIAL

## 2025-07-02 VITALS
TEMPERATURE: 98.7 F | DIASTOLIC BLOOD PRESSURE: 74 MMHG | RESPIRATION RATE: 14 BRPM | OXYGEN SATURATION: 98 % | SYSTOLIC BLOOD PRESSURE: 109 MMHG | BODY MASS INDEX: 29.92 KG/M2 | WEIGHT: 158.5 LBS | HEART RATE: 80 BPM | HEIGHT: 61 IN

## 2025-07-02 DIAGNOSIS — E66.09 CLASS 1 OBESITY DUE TO EXCESS CALORIES WITH SERIOUS COMORBIDITY AND BODY MASS INDEX (BMI) OF 34.0 TO 34.9 IN ADULT: Primary | ICD-10-CM

## 2025-07-02 DIAGNOSIS — E66.811 CLASS 1 OBESITY DUE TO EXCESS CALORIES WITH SERIOUS COMORBIDITY AND BODY MASS INDEX (BMI) OF 34.0 TO 34.9 IN ADULT: Primary | ICD-10-CM

## 2025-07-02 PROCEDURE — 3074F SYST BP LT 130 MM HG: CPT

## 2025-07-02 PROCEDURE — 3078F DIAST BP <80 MM HG: CPT

## 2025-07-02 PROCEDURE — 99207 PR NO CHARGE NURSE ONLY: CPT

## 2025-07-02 NOTE — TELEPHONE ENCOUNTER
Spoke with patient - message from PA team below relayed and patient verbalized understanding. Patient would like provider recommendations on staying consistent while taking a 6 month break from the medication. Patient is scheduled 07/18/2025 with Dr. Beach to discuss.

## 2025-07-02 NOTE — TELEPHONE ENCOUNTER
[facility] Prior Authorization Team   Phone: [phone]        Called [place] Medicaid [employer] line to initiate renewal for Zepbound - spoke with Chhaya. Informed me that the patient had just received the renewal which  on 25 and patient is required to take a 6 month break from the medication.

## 2025-07-21 ENCOUNTER — PATIENT OUTREACH (OUTPATIENT)
Dept: CARE COORDINATION | Facility: CLINIC | Age: 45
End: 2025-07-21
Payer: COMMERCIAL